# Patient Record
Sex: MALE | Race: BLACK OR AFRICAN AMERICAN | NOT HISPANIC OR LATINO | Employment: OTHER | URBAN - METROPOLITAN AREA
[De-identification: names, ages, dates, MRNs, and addresses within clinical notes are randomized per-mention and may not be internally consistent; named-entity substitution may affect disease eponyms.]

---

## 2020-01-01 ENCOUNTER — HOSPITAL ENCOUNTER (INPATIENT)
Facility: HOSPITAL | Age: 70
LOS: 1 days | DRG: 871 | End: 2020-10-19
Attending: STUDENT IN AN ORGANIZED HEALTH CARE EDUCATION/TRAINING PROGRAM | Admitting: EMERGENCY MEDICINE
Payer: MEDICARE

## 2020-01-01 VITALS
RESPIRATION RATE: 10 BRPM | BODY MASS INDEX: 29.28 KG/M2 | OXYGEN SATURATION: 86 % | DIASTOLIC BLOOD PRESSURE: 75 MMHG | SYSTOLIC BLOOD PRESSURE: 98 MMHG | TEMPERATURE: 96 F | HEIGHT: 78 IN | WEIGHT: 253.06 LBS

## 2020-01-01 DIAGNOSIS — Z51.5 PALLIATIVE CARE ENCOUNTER: ICD-10-CM

## 2020-01-01 DIAGNOSIS — R57.9 SHOCK: ICD-10-CM

## 2020-01-01 DIAGNOSIS — K92.2 GASTROINTESTINAL HEMORRHAGE, UNSPECIFIED GASTROINTESTINAL HEMORRHAGE TYPE: ICD-10-CM

## 2020-01-01 DIAGNOSIS — I95.9 HYPOTENSIVE EPISODE: ICD-10-CM

## 2020-01-01 DIAGNOSIS — I48.91 A-FIB: ICD-10-CM

## 2020-01-01 DIAGNOSIS — I26.99 PULMONARY EMBOLISM: ICD-10-CM

## 2020-01-01 DIAGNOSIS — R41.82 ALTERED MENTAL STATUS: Primary | ICD-10-CM

## 2020-01-01 DIAGNOSIS — D64.9 ANEMIA, UNSPECIFIED TYPE: ICD-10-CM

## 2020-01-01 DIAGNOSIS — I26.99 PULMONARY EMBOLISM, UNSPECIFIED CHRONICITY, UNSPECIFIED PULMONARY EMBOLISM TYPE, UNSPECIFIED WHETHER ACUTE COR PULMONALE PRESENT: ICD-10-CM

## 2020-01-01 DIAGNOSIS — E86.0 DEHYDRATION: ICD-10-CM

## 2020-01-01 DIAGNOSIS — N17.9 AKI (ACUTE KIDNEY INJURY): ICD-10-CM

## 2020-01-01 DIAGNOSIS — R19.7 DIARRHEA, UNSPECIFIED TYPE: ICD-10-CM

## 2020-01-01 DIAGNOSIS — I48.91 ATRIAL FIBRILLATION, UNSPECIFIED TYPE: ICD-10-CM

## 2020-01-01 DIAGNOSIS — I21.4 NSTEMI (NON-ST ELEVATED MYOCARDIAL INFARCTION): ICD-10-CM

## 2020-01-01 DIAGNOSIS — A41.9 SEPSIS, DUE TO UNSPECIFIED ORGANISM, UNSPECIFIED WHETHER ACUTE ORGAN DYSFUNCTION PRESENT: ICD-10-CM

## 2020-01-01 DIAGNOSIS — R79.89 ELEVATED TROPONIN: ICD-10-CM

## 2020-01-01 LAB
ABO + RH BLD: NORMAL
ALBUMIN SERPL BCP-MCNC: 1.6 G/DL (ref 3.5–5.2)
ALP SERPL-CCNC: 174 U/L (ref 55–135)
ALT SERPL W/O P-5'-P-CCNC: 14 U/L (ref 10–44)
ANION GAP SERPL CALC-SCNC: 14 MMOL/L (ref 8–16)
APTT BLDCRRT: 26 SEC (ref 21–32)
ASCENDING AORTA: 3.1 CM
AST SERPL-CCNC: 32 U/L (ref 10–40)
AV INDEX (PROSTH): 1.17
AV MEAN GRADIENT: 2 MMHG
AV PEAK GRADIENT: 3 MMHG
AV VALVE AREA: 3.49 CM2
AV VELOCITY RATIO: 0.98
BACTERIA #/AREA URNS HPF: ABNORMAL /HPF
BASOPHILS # BLD AUTO: 0.02 K/UL (ref 0–0.2)
BASOPHILS NFR BLD: 0.1 % (ref 0–1.9)
BILIRUB SERPL-MCNC: 0.8 MG/DL (ref 0.1–1)
BILIRUB UR QL STRIP: ABNORMAL
BLD GP AB SCN CELLS X3 SERPL QL: NORMAL
BNP SERPL-MCNC: 58 PG/ML (ref 0–99)
BSA FOR ECHO PROCEDURE: 2.51 M2
BUN SERPL-MCNC: 71 MG/DL (ref 8–23)
C DIFF GDH STL QL: POSITIVE
C DIFF TOX A+B STL QL IA: NEGATIVE
CALCIUM SERPL-MCNC: 7.2 MG/DL (ref 8.7–10.5)
CHLORIDE SERPL-SCNC: 108 MMOL/L (ref 95–110)
CLARITY UR: ABNORMAL
CO2 SERPL-SCNC: 11 MMOL/L (ref 23–29)
COLOR UR: ABNORMAL
CREAT SERPL-MCNC: 1.8 MG/DL (ref 0.5–1.4)
CTP QC/QA: YES
CTP QC/QA: YES
CV ECHO LV RWT: 0.25 CM
D DIMER PPP IA.FEU-MCNC: 0.73 MG/L FEU
DIFFERENTIAL METHOD: ABNORMAL
DOP CALC AO PEAK VEL: 0.82 M/S
DOP CALC AO VTI: 8.09 CM
DOP CALC LVOT AREA: 3 CM2
DOP CALC LVOT DIAMETER: 1.95 CM
DOP CALC LVOT PEAK VEL: 0.8 M/S
DOP CALC LVOT STROKE VOLUME: 28.27 CM3
DOP CALCLVOT PEAK VEL VTI: 9.47 CM
ECHO LV POSTERIOR WALL: 0.86 CM (ref 0.6–1.1)
EOSINOPHIL # BLD AUTO: 0 K/UL (ref 0–0.5)
EOSINOPHIL NFR BLD: 0 % (ref 0–8)
ERYTHROCYTE [DISTWIDTH] IN BLOOD BY AUTOMATED COUNT: 17.2 % (ref 11.5–14.5)
EST. GFR  (AFRICAN AMERICAN): 43 ML/MIN/1.73 M^2
EST. GFR  (NON AFRICAN AMERICAN): 37 ML/MIN/1.73 M^2
FECAL OCCULT BLOOD, POC: POSITIVE
FRACTIONAL SHORTENING: 19 % (ref 28–44)
GLUCOSE SERPL-MCNC: 117 MG/DL (ref 70–110)
GLUCOSE UR QL STRIP: ABNORMAL
HCT VFR BLD AUTO: 23 % (ref 40–54)
HGB BLD-MCNC: 7.7 G/DL (ref 14–18)
HGB UR QL STRIP: ABNORMAL
HYALINE CASTS #/AREA URNS LPF: 0 /LPF
IMM GRANULOCYTES # BLD AUTO: 0.51 K/UL (ref 0–0.04)
IMM GRANULOCYTES NFR BLD AUTO: 3.3 % (ref 0–0.5)
INR PPP: 1.3 (ref 0.8–1.2)
INTERVENTRICULAR SEPTUM: 1.1 CM (ref 0.6–1.1)
IVRT: 89.97 MSEC
KETONES UR QL STRIP: NEGATIVE
LA MAJOR: 4.88 CM
LACTATE SERPL-SCNC: 2.2 MMOL/L (ref 0.5–2.2)
LACTATE SERPL-SCNC: 2.9 MMOL/L (ref 0.5–2.2)
LEFT ATRIUM SIZE: 3.98 CM
LEFT INTERNAL DIMENSION IN SYSTOLE: 5.5 CM (ref 2.1–4)
LEFT VENTRICLE DIASTOLIC VOLUME INDEX: 95.36 ML/M2
LEFT VENTRICLE DIASTOLIC VOLUME: 237.94 ML
LEFT VENTRICLE MASS INDEX: 119 G/M2
LEFT VENTRICLE SYSTOLIC VOLUME INDEX: 59 ML/M2
LEFT VENTRICLE SYSTOLIC VOLUME: 147.27 ML
LEFT VENTRICULAR INTERNAL DIMENSION IN DIASTOLE: 6.78 CM (ref 3.5–6)
LEFT VENTRICULAR MASS: 296.76 G
LEUKOCYTE ESTERASE UR QL STRIP: ABNORMAL
LIPASE SERPL-CCNC: 98 U/L (ref 4–60)
LYMPHOCYTES # BLD AUTO: 0.8 K/UL (ref 1–4.8)
LYMPHOCYTES NFR BLD: 5.3 % (ref 18–48)
MCH RBC QN AUTO: 31.3 PG (ref 27–31)
MCHC RBC AUTO-ENTMCNC: 33.5 G/DL (ref 32–36)
MCV RBC AUTO: 94 FL (ref 82–98)
MICROSCOPIC COMMENT: ABNORMAL
MONOCYTES # BLD AUTO: 0.6 K/UL (ref 0.3–1)
MONOCYTES NFR BLD: 4 % (ref 4–15)
NEUTROPHILS # BLD AUTO: 13.6 K/UL (ref 1.8–7.7)
NEUTROPHILS NFR BLD: 87.3 % (ref 38–73)
NITRITE UR QL STRIP: NEGATIVE
NRBC BLD-RTO: 1 /100 WBC
PH UR STRIP: 5 [PH] (ref 5–8)
PISA TR MAX VEL: 2.93 M/S
PLATELET # BLD AUTO: 218 K/UL (ref 150–350)
PMV BLD AUTO: 9.3 FL (ref 9.2–12.9)
POTASSIUM SERPL-SCNC: 4.2 MMOL/L (ref 3.5–5.1)
PROT SERPL-MCNC: 4.3 G/DL (ref 6–8.4)
PROT UR QL STRIP: ABNORMAL
PROTHROMBIN TIME: 14 SEC (ref 9–12.5)
PV PEAK VELOCITY: 1.31 CM/S
RA MAJOR: 3.84 CM
RA PRESSURE: 8 MMHG
RA WIDTH: 3.13 CM
RBC # BLD AUTO: 2.46 M/UL (ref 4.6–6.2)
RBC #/AREA URNS HPF: 13 /HPF (ref 0–4)
RIGHT VENTRICULAR END-DIASTOLIC DIMENSION: 3.11 CM
RV TISSUE DOPPLER FREE WALL SYSTOLIC VELOCITY 1 (APICAL 4 CHAMBER VIEW): 8.45 CM/S
SARS-COV-2 RDRP RESP QL NAA+PROBE: NEGATIVE
SINUS: 2.97 CM
SODIUM SERPL-SCNC: 133 MMOL/L (ref 136–145)
SP GR UR STRIP: 1.02 (ref 1–1.03)
STJ: 2.87 CM
TR MAX PG: 34 MMHG
TRICUSPID ANNULAR PLANE SYSTOLIC EXCURSION: 1.41 CM
TROPONIN I SERPL DL<=0.01 NG/ML-MCNC: 1.88 NG/ML (ref 0–0.03)
TROPONIN I SERPL DL<=0.01 NG/ML-MCNC: 34.9 NG/ML (ref 0–0.03)
TV REST PULMONARY ARTERY PRESSURE: 42 MMHG
URN SPEC COLLECT METH UR: ABNORMAL
UROBILINOGEN UR STRIP-ACNC: NEGATIVE EU/DL
WBC # BLD AUTO: 15.53 K/UL (ref 3.9–12.7)
WBC #/AREA URNS HPF: 8 /HPF (ref 0–5)

## 2020-01-01 PROCEDURE — A4216 STERILE WATER/SALINE, 10 ML: HCPCS | Performed by: STUDENT IN AN ORGANIZED HEALTH CARE EDUCATION/TRAINING PROGRAM

## 2020-01-01 PROCEDURE — 25000003 PHARM REV CODE 250: Performed by: EMERGENCY MEDICINE

## 2020-01-01 PROCEDURE — 87077 CULTURE AEROBIC IDENTIFY: CPT

## 2020-01-01 PROCEDURE — 87046 STOOL CULTR AEROBIC BACT EA: CPT

## 2020-01-01 PROCEDURE — 86920 COMPATIBILITY TEST SPIN: CPT

## 2020-01-01 PROCEDURE — 86901 BLOOD TYPING SEROLOGIC RH(D): CPT

## 2020-01-01 PROCEDURE — 96375 TX/PRO/DX INJ NEW DRUG ADDON: CPT

## 2020-01-01 PROCEDURE — 83880 ASSAY OF NATRIURETIC PEPTIDE: CPT

## 2020-01-01 PROCEDURE — 96365 THER/PROPH/DIAG IV INF INIT: CPT

## 2020-01-01 PROCEDURE — 99291 PR CRITICAL CARE, E/M 30-74 MINUTES: ICD-10-PCS | Mod: ,,, | Performed by: INTERNAL MEDICINE

## 2020-01-01 PROCEDURE — 84484 ASSAY OF TROPONIN QUANT: CPT

## 2020-01-01 PROCEDURE — C1751 CATH, INF, PER/CENT/MIDLINE: HCPCS

## 2020-01-01 PROCEDURE — 25000003 PHARM REV CODE 250: Performed by: STUDENT IN AN ORGANIZED HEALTH CARE EDUCATION/TRAINING PROGRAM

## 2020-01-01 PROCEDURE — C9113 INJ PANTOPRAZOLE SODIUM, VIA: HCPCS | Performed by: EMERGENCY MEDICINE

## 2020-01-01 PROCEDURE — 87449 NOS EACH ORGANISM AG IA: CPT

## 2020-01-01 PROCEDURE — 99223 1ST HOSP IP/OBS HIGH 75: CPT | Mod: ,,, | Performed by: NURSE PRACTITIONER

## 2020-01-01 PROCEDURE — 83605 ASSAY OF LACTIC ACID: CPT | Mod: 91

## 2020-01-01 PROCEDURE — 99498 PR ADVNCD CARE PLAN ADDL 30 MIN: ICD-10-PCS | Mod: ,,, | Performed by: NURSE PRACTITIONER

## 2020-01-01 PROCEDURE — 93005 ELECTROCARDIOGRAM TRACING: CPT

## 2020-01-01 PROCEDURE — 85730 THROMBOPLASTIN TIME PARTIAL: CPT

## 2020-01-01 PROCEDURE — 63600175 PHARM REV CODE 636 W HCPCS: Performed by: EMERGENCY MEDICINE

## 2020-01-01 PROCEDURE — 87040 BLOOD CULTURE FOR BACTERIA: CPT

## 2020-01-01 PROCEDURE — 63600175 PHARM REV CODE 636 W HCPCS: Performed by: INTERNAL MEDICINE

## 2020-01-01 PROCEDURE — 20000000 HC ICU ROOM

## 2020-01-01 PROCEDURE — 84484 ASSAY OF TROPONIN QUANT: CPT | Mod: 91

## 2020-01-01 PROCEDURE — 85610 PROTHROMBIN TIME: CPT

## 2020-01-01 PROCEDURE — 83690 ASSAY OF LIPASE: CPT

## 2020-01-01 PROCEDURE — 63600175 PHARM REV CODE 636 W HCPCS: Performed by: HOSPITALIST

## 2020-01-01 PROCEDURE — 36415 COLL VENOUS BLD VENIPUNCTURE: CPT

## 2020-01-01 PROCEDURE — 87324 CLOSTRIDIUM AG IA: CPT

## 2020-01-01 PROCEDURE — U0002 COVID-19 LAB TEST NON-CDC: HCPCS | Performed by: STUDENT IN AN ORGANIZED HEALTH CARE EDUCATION/TRAINING PROGRAM

## 2020-01-01 PROCEDURE — 87045 FECES CULTURE AEROBIC BACT: CPT

## 2020-01-01 PROCEDURE — 81000 URINALYSIS NONAUTO W/SCOPE: CPT

## 2020-01-01 PROCEDURE — 93010 ELECTROCARDIOGRAM REPORT: CPT | Mod: 76,,, | Performed by: INTERNAL MEDICINE

## 2020-01-01 PROCEDURE — 96360 HYDRATION IV INFUSION INIT: CPT | Mod: 59

## 2020-01-01 PROCEDURE — 87186 SC STD MICRODIL/AGAR DIL: CPT

## 2020-01-01 PROCEDURE — 36430 TRANSFUSION BLD/BLD COMPNT: CPT

## 2020-01-01 PROCEDURE — 36569 INSJ PICC 5 YR+ W/O IMAGING: CPT

## 2020-01-01 PROCEDURE — 93010 ELECTROCARDIOGRAM REPORT: CPT | Mod: ,,, | Performed by: INTERNAL MEDICINE

## 2020-01-01 PROCEDURE — 96361 HYDRATE IV INFUSION ADD-ON: CPT | Mod: 59

## 2020-01-01 PROCEDURE — P9021 RED BLOOD CELLS UNIT: HCPCS

## 2020-01-01 PROCEDURE — 99223 PR INITIAL HOSPITAL CARE,LEVL III: ICD-10-PCS | Mod: ,,, | Performed by: NURSE PRACTITIONER

## 2020-01-01 PROCEDURE — 25000003 PHARM REV CODE 250: Performed by: HOSPITALIST

## 2020-01-01 PROCEDURE — 25500020 PHARM REV CODE 255: Performed by: STUDENT IN AN ORGANIZED HEALTH CARE EDUCATION/TRAINING PROGRAM

## 2020-01-01 PROCEDURE — 85025 COMPLETE CBC W/AUTO DIFF WBC: CPT

## 2020-01-01 PROCEDURE — 80053 COMPREHEN METABOLIC PANEL: CPT

## 2020-01-01 PROCEDURE — 93010 EKG 12-LEAD: ICD-10-PCS | Mod: 76,,, | Performed by: INTERNAL MEDICINE

## 2020-01-01 PROCEDURE — 99291 CRITICAL CARE FIRST HOUR: CPT | Mod: ,,, | Performed by: INTERNAL MEDICINE

## 2020-01-01 PROCEDURE — 85379 FIBRIN DEGRADATION QUANT: CPT

## 2020-01-01 PROCEDURE — 63600175 PHARM REV CODE 636 W HCPCS: Performed by: STUDENT IN AN ORGANIZED HEALTH CARE EDUCATION/TRAINING PROGRAM

## 2020-01-01 PROCEDURE — 99497 ADVNCD CARE PLAN 30 MIN: CPT | Mod: 25,,, | Performed by: NURSE PRACTITIONER

## 2020-01-01 PROCEDURE — 63600175 PHARM REV CODE 636 W HCPCS: Performed by: NURSE PRACTITIONER

## 2020-01-01 PROCEDURE — 87427 SHIGA-LIKE TOXIN AG IA: CPT

## 2020-01-01 PROCEDURE — 99498 ADVNCD CARE PLAN ADDL 30 MIN: CPT | Mod: ,,, | Performed by: NURSE PRACTITIONER

## 2020-01-01 PROCEDURE — 99497 PR ADVNCD CARE PLAN 30 MIN: ICD-10-PCS | Mod: 25,,, | Performed by: NURSE PRACTITIONER

## 2020-01-01 PROCEDURE — 99291 CRITICAL CARE FIRST HOUR: CPT | Mod: 25

## 2020-01-01 PROCEDURE — 87493 C DIFF AMPLIFIED PROBE: CPT

## 2020-01-01 RX ORDER — ONDANSETRON 2 MG/ML
8 INJECTION INTRAMUSCULAR; INTRAVENOUS EVERY 6 HOURS PRN
Status: DISCONTINUED | OUTPATIENT
Start: 2020-01-01 | End: 2020-01-01 | Stop reason: HOSPADM

## 2020-01-01 RX ORDER — POTASSIUM CHLORIDE 750 MG/1
10 CAPSULE, EXTENDED RELEASE ORAL ONCE
COMMUNITY

## 2020-01-01 RX ORDER — POLYETHYLENE GLYCOL 3350 17 G/17G
17 POWDER, FOR SOLUTION ORAL 2 TIMES DAILY PRN
Status: DISCONTINUED | OUTPATIENT
Start: 2020-01-01 | End: 2020-01-01 | Stop reason: HOSPADM

## 2020-01-01 RX ORDER — SODIUM CHLORIDE 0.9 % (FLUSH) 0.9 %
10 SYRINGE (ML) INJECTION
Status: DISCONTINUED | OUTPATIENT
Start: 2020-01-01 | End: 2020-01-01 | Stop reason: HOSPADM

## 2020-01-01 RX ORDER — HEPARIN SODIUM,PORCINE/D5W 25000/250
18 INTRAVENOUS SOLUTION INTRAVENOUS CONTINUOUS
Status: DISCONTINUED | OUTPATIENT
Start: 2020-01-01 | End: 2020-01-01 | Stop reason: HOSPADM

## 2020-01-01 RX ORDER — PANTOPRAZOLE SODIUM 40 MG/10ML
40 INJECTION, POWDER, LYOPHILIZED, FOR SOLUTION INTRAVENOUS 2 TIMES DAILY
Status: DISCONTINUED | OUTPATIENT
Start: 2020-01-01 | End: 2020-01-01 | Stop reason: HOSPADM

## 2020-01-01 RX ORDER — MIDODRINE HYDROCHLORIDE 2.5 MG/1
2.5 TABLET ORAL 2 TIMES DAILY WITH MEALS
Status: DISCONTINUED | OUTPATIENT
Start: 2020-01-01 | End: 2020-01-01 | Stop reason: HOSPADM

## 2020-01-01 RX ORDER — SODIUM CHLORIDE 0.9 % (FLUSH) 0.9 %
10 SYRINGE (ML) INJECTION EVERY 6 HOURS
Status: DISCONTINUED | OUTPATIENT
Start: 2020-01-01 | End: 2020-01-01 | Stop reason: HOSPADM

## 2020-01-01 RX ORDER — DIGOXIN 0.25 MG/ML
500 INJECTION INTRAMUSCULAR; INTRAVENOUS
Status: COMPLETED | OUTPATIENT
Start: 2020-01-01 | End: 2020-01-01

## 2020-01-01 RX ORDER — MORPHINE SULFATE 4 MG/ML
2 INJECTION, SOLUTION INTRAMUSCULAR; INTRAVENOUS
Status: DISCONTINUED | OUTPATIENT
Start: 2020-01-01 | End: 2020-01-01 | Stop reason: HOSPADM

## 2020-01-01 RX ORDER — ONDANSETRON 4 MG/1
4 TABLET, FILM COATED ORAL EVERY 8 HOURS PRN
COMMUNITY

## 2020-01-01 RX ORDER — OMEPRAZOLE 20 MG/1
20 CAPSULE, DELAYED RELEASE ORAL DAILY
COMMUNITY

## 2020-01-01 RX ORDER — LOPERAMIDE HYDROCHLORIDE 2 MG/1
2 CAPSULE ORAL 4 TIMES DAILY PRN
COMMUNITY

## 2020-01-01 RX ORDER — MIDODRINE HYDROCHLORIDE 5 MG/1
2.5 TABLET ORAL 2 TIMES DAILY WITH MEALS
COMMUNITY

## 2020-01-01 RX ORDER — ACETAMINOPHEN 325 MG/1
650 TABLET ORAL EVERY 4 HOURS PRN
Status: DISCONTINUED | OUTPATIENT
Start: 2020-01-01 | End: 2020-01-01 | Stop reason: HOSPADM

## 2020-01-01 RX ORDER — SIMVASTATIN 40 MG/1
40 TABLET, FILM COATED ORAL NIGHTLY
COMMUNITY

## 2020-01-01 RX ORDER — LORAZEPAM 2 MG/ML
1 INJECTION INTRAMUSCULAR
Status: DISCONTINUED | OUTPATIENT
Start: 2020-01-01 | End: 2020-01-01 | Stop reason: HOSPADM

## 2020-01-01 RX ORDER — HYDROCODONE BITARTRATE AND ACETAMINOPHEN 500; 5 MG/1; MG/1
TABLET ORAL
Status: DISCONTINUED | OUTPATIENT
Start: 2020-01-01 | End: 2020-01-01 | Stop reason: HOSPADM

## 2020-01-01 RX ORDER — PANTOPRAZOLE SODIUM 40 MG/10ML
40 INJECTION, POWDER, LYOPHILIZED, FOR SOLUTION INTRAVENOUS
Status: COMPLETED | OUTPATIENT
Start: 2020-01-01 | End: 2020-01-01

## 2020-01-01 RX ORDER — MORPHINE SULFATE 4 MG/ML
1 INJECTION, SOLUTION INTRAMUSCULAR; INTRAVENOUS ONCE
Status: COMPLETED | OUTPATIENT
Start: 2020-01-01 | End: 2020-01-01

## 2020-01-01 RX ORDER — PANTOPRAZOLE SODIUM 40 MG/1
40 TABLET, DELAYED RELEASE ORAL DAILY
COMMUNITY

## 2020-01-01 RX ORDER — TAMSULOSIN HYDROCHLORIDE 0.4 MG/1
0.4 CAPSULE ORAL DAILY
COMMUNITY

## 2020-01-01 RX ORDER — MIDODRINE HYDROCHLORIDE 2.5 MG/1
2.5 TABLET ORAL 2 TIMES DAILY WITH MEALS
Status: DISCONTINUED | OUTPATIENT
Start: 2020-01-01 | End: 2020-01-01

## 2020-01-01 RX ORDER — LORAZEPAM 2 MG/ML
1 INJECTION INTRAMUSCULAR ONCE
Status: COMPLETED | OUTPATIENT
Start: 2020-01-01 | End: 2020-01-01

## 2020-01-01 RX ORDER — NOREPINEPHRINE BITARTRATE/D5W 4MG/250ML
0.05 PLASTIC BAG, INJECTION (ML) INTRAVENOUS CONTINUOUS
Status: DISCONTINUED | OUTPATIENT
Start: 2020-01-01 | End: 2020-01-01 | Stop reason: HOSPADM

## 2020-01-01 RX ORDER — MUPIROCIN 20 MG/G
OINTMENT TOPICAL 2 TIMES DAILY
Status: DISCONTINUED | OUTPATIENT
Start: 2020-01-01 | End: 2020-01-01 | Stop reason: HOSPADM

## 2020-01-01 RX ADMIN — DIGOXIN 500 MCG: 0.25 INJECTION INTRAMUSCULAR; INTRAVENOUS at 07:10

## 2020-01-01 RX ADMIN — VANCOMYCIN HYDROCHLORIDE 2000 MG: 10 INJECTION, POWDER, LYOPHILIZED, FOR SOLUTION INTRAVENOUS at 06:10

## 2020-01-01 RX ADMIN — HEPARIN SODIUM AND DEXTROSE 18 UNITS/KG/HR: 10000; 5 INJECTION INTRAVENOUS at 07:10

## 2020-01-01 RX ADMIN — MORPHINE SULFATE 2 MG: 4 INJECTION INTRAVENOUS at 01:10

## 2020-01-01 RX ADMIN — AMIODARONE HYDROCHLORIDE 1 MG/MIN: 1.8 INJECTION, SOLUTION INTRAVENOUS at 07:10

## 2020-01-01 RX ADMIN — Medication 0.8 MCG/KG/MIN: at 10:10

## 2020-01-01 RX ADMIN — Medication 0.05 MCG/KG/MIN: at 04:10

## 2020-01-01 RX ADMIN — Medication 10 ML: at 06:10

## 2020-01-01 RX ADMIN — PANTOPRAZOLE SODIUM 40 MG: 40 INJECTION, POWDER, FOR SOLUTION INTRAVENOUS at 05:10

## 2020-01-01 RX ADMIN — Medication 0.85 MCG/KG/MIN: at 09:10

## 2020-01-01 RX ADMIN — Medication 0.8 MCG/KG/MIN: at 11:10

## 2020-01-01 RX ADMIN — Medication 0.85 MCG/KG/MIN: at 08:10

## 2020-01-01 RX ADMIN — AMIODARONE HYDROCHLORIDE 1 MG/MIN: 1.8 INJECTION, SOLUTION INTRAVENOUS at 12:10

## 2020-01-01 RX ADMIN — AMIODARONE HYDROCHLORIDE 150 MG: 1.5 INJECTION, SOLUTION INTRAVENOUS at 07:10

## 2020-01-01 RX ADMIN — SODIUM CHLORIDE, SODIUM LACTATE, POTASSIUM CHLORIDE, AND CALCIUM CHLORIDE 2000 ML: .6; .31; .03; .02 INJECTION, SOLUTION INTRAVENOUS at 01:10

## 2020-01-01 RX ADMIN — MIDODRINE HYDROCHLORIDE 2.5 MG: 2.5 TABLET ORAL at 11:10

## 2020-01-01 RX ADMIN — IOHEXOL 100 ML: 350 INJECTION, SOLUTION INTRAVENOUS at 04:10

## 2020-01-01 RX ADMIN — CEFTRIAXONE 2 G: 2 INJECTION, SOLUTION INTRAVENOUS at 05:10

## 2020-01-01 RX ADMIN — MORPHINE SULFATE 1 MG: 4 INJECTION INTRAVENOUS at 01:10

## 2020-01-01 RX ADMIN — NOREPINEPHRINE BITARTRATE 3 MCG/KG/MIN: 1 INJECTION, SOLUTION, CONCENTRATE INTRAVENOUS at 02:10

## 2020-01-01 RX ADMIN — LORAZEPAM 1 MG: 2 INJECTION INTRAMUSCULAR; INTRAVENOUS at 12:10

## 2020-01-01 RX ADMIN — MUPIROCIN: 20 OINTMENT TOPICAL at 08:10

## 2020-01-01 RX ADMIN — NOREPINEPHRINE BITARTRATE 0.8 MCG/KG/MIN: 1 INJECTION, SOLUTION, CONCENTRATE INTRAVENOUS at 12:10

## 2020-01-01 RX ADMIN — Medication 0.8 MCG/KG/MIN: at 12:10

## 2020-01-01 RX ADMIN — PIPERACILLIN AND TAZOBACTAM 4.5 G: 4; .5 INJECTION, POWDER, LYOPHILIZED, FOR SOLUTION INTRAVENOUS; PARENTERAL at 08:10

## 2020-01-01 RX ADMIN — Medication 125 MG: at 11:10

## 2020-01-01 RX ADMIN — Medication 0.92 MCG/KG/MIN: at 08:10

## 2020-01-01 RX ADMIN — Medication 10 ML: at 12:10

## 2020-01-01 RX ADMIN — SODIUM CHLORIDE, SODIUM LACTATE, POTASSIUM CHLORIDE, AND CALCIUM CHLORIDE 1000 ML: .6; .31; .03; .02 INJECTION, SOLUTION INTRAVENOUS at 04:10

## 2020-10-19 PROBLEM — E87.20 METABOLIC ACIDOSIS: Status: ACTIVE | Noted: 2020-01-01

## 2020-10-19 PROBLEM — D72.829 LEUKOCYTOSIS: Status: ACTIVE | Noted: 2020-01-01

## 2020-10-19 PROBLEM — I26.99 ACUTE PULMONARY EMBOLISM: Status: ACTIVE | Noted: 2020-01-01

## 2020-10-19 PROBLEM — I21.4 NSTEMI (NON-ST ELEVATED MYOCARDIAL INFARCTION): Status: ACTIVE | Noted: 2020-01-01

## 2020-10-19 PROBLEM — I48.91 ATRIAL FIBRILLATION: Status: ACTIVE | Noted: 2020-01-01

## 2020-10-19 PROBLEM — N17.9 ARF (ACUTE RENAL FAILURE): Status: ACTIVE | Noted: 2020-01-01

## 2020-10-19 PROBLEM — G93.41 ACUTE METABOLIC ENCEPHALOPATHY: Status: ACTIVE | Noted: 2020-01-01

## 2020-10-19 PROBLEM — D64.9 ANEMIA: Status: ACTIVE | Noted: 2020-01-01

## 2020-10-19 PROBLEM — R57.9 SHOCK: Status: ACTIVE | Noted: 2020-01-01

## 2020-10-19 PROBLEM — E87.1 HYPONATREMIA: Status: ACTIVE | Noted: 2020-01-01

## 2020-10-19 PROBLEM — R41.82 ALTERED MENTAL STATUS: Status: ACTIVE | Noted: 2020-01-01

## 2020-10-19 NOTE — SUBJECTIVE & OBJECTIVE
Palliative medicine consult received and discussed with Dr. Dyer.  Patient resting with son, Ben, at bedside.  Patient oriented to person only.  Able to answer simple questions only.  Palliative services introduced to Ben and he was receptive to visit.      Prior to hospitalization, patient was oriented x 3 and interactive. He has been bedbound for approx 1 year, after experiencing severe knee pain and undergoing knee replacement, with no improvement. Due to coronavirus restrictions at nursing home, visits were limited but pt's son and daughter visited as often as they could.  Per Ben, patient has lost more than 100 pounds in last year (per medical records, on 7/18/19, patient's weight was 406, now 253).  Ben discussed the decline in function that has been difficult for patient, since August 2020. He has required on hospitalization in the last year and has ongoing hypotension, all of which has been concerning for patient and his son.     Advance Care Planning     Power of   I initiated the process of advance care planning today and explained the importance of this process to the patient's son.  I introduced the concept of advance directives, as well. The patient has previously appointed a HCPOA and designated [facility] as [facility].   [facility] which was verified and a copy placed in chart.     Patient also has a daughter, Delmi, who is very involved in patient's care.      Discussed code status options, including risks and benefits of full code status, partial code status, and DNR status.  Ben stated that patient would want every measure taken to extend life, including CPR and mechanical ventilation, even if quality of life was decreased.  During this conversation, although patient had initially appeared comfortable and denied complaints, he began to appear agitated and asked for BP cuff to be removed.  At first, he was able to be consoled and within a few minutes,  agitation increased. Discussed interventions for agitation, including ativan and initially Ben declined as he was concerned regarding possibility of hypotension.  However, as patient's agitation continued, discussed risks vs benefits and Ben ok with Ativan.  Discussed that small dose could be given and increased if needed or discontinued if needed. Ben became upset with the change in cognition and offered to continue goals of care conversation in conference room to provide a space for him to express emotions. Time was allowed for him to express concerns.  Emotional support provided.  Ben expressed concern that his presence was worsening patient's condition and it was discussed that medical conditions can affect behavior and it appears that patient's medical condition is declining rapidly.     Advance Care Planning     [facility]  I engaged the family in a conversation about advance care planning and we specifically addressed what the goals of care would be moving forward, in light of the patient's change in clinical status, specifically shock, pulmonary embolism, STEMI.  We did specifically address the patient's likely prognosis, which is poor. During this conversation, Bne became appropriately tearful and discussed his mother (patient's wife) death, while in the ICU, after rapid decline.      He understands that patient's status has rapidly declined and although he previously stated he wanted every opportunity to extend patient's life, we discussed risks and benefits of code status further in relation to decline since conversation a few minutes ago. We explored the patient's values and preferences for future care.  The family endorses that what is most important right now is to focus on symptom/pain control and quality of life, even if it means sacrificing a little time.  Ben states that it is most important that patient be comfortable and if he had to choose between more time and less quality of  life vs more quality of life/comfort and less time, he would choose to focus on comfort only. At this time, he became more tearful and provide him a few minutes alone to process emotions.  Discussed conversation with Dr. Dyer and Dr. Pandya, who was evaluating patient.  Since patient's son had stepped away to discuss goals of care, patient had decompensated further, now with increased agitation and now with chest pain.     Returned to patient's son with Dr. Pandya, who updated Valance on patient's condition.  Valance confirmed decision to transition to comfort care and DNR status. Offered to contact family and he declined, stating that he had just spoke to patient's daughter, Delmi.  Ben will go to  his son from school so that he can visit.  Discussed with Dr. Pandya, who ordered comfort interventions.     I did not explain the role for hospice care at this stage of the patient's illness due to acute change in condition. We will not be making a hospice referral at this time but will determine based on condition.     Encounter: [id number]      1400: Patient's friend, [place], at bedside to visit.  Patient's son had returned with his son but left to bring his son home and plans to return.     1515: Notified of patient's death.  Call to patient's son, no answer.  Offered condolences to [place] and he stated he talked to patient's children and they have been notified.  Berlin requested to wait for family to arrive in waiting room.  He was escorted to waiting room and  notified.  Bereavement tray ordered.                         Past Medical History:   Diagnosis Date    BPH (benign prostatic hyperplasia)     CKD (chronic kidney disease)     Constipation     GERD (gastroesophageal reflux disease)     Hemiplegia affecting dominant side, post-stroke     Hyperlipidemia     Hypertension     Hypokalemia     Muscle wasting and atrophy, not elsewhere classified, multiple sites     Orthostatic  hypotension     Osteoarthritis     Stroke     TIA (transient ischemic attack)     UTI (urinary tract infection)        Past Surgical History:   Procedure Laterality Date    CHOLECYSTECTOMY      JOINT REPLACEMENT      TRP         Review of patient's allergies indicates:  No Known Allergies    Medications:  Continuous Infusions:   amiodarone in dextrose 5% Stopped (10/19/20 1516)    heparin (porcine) in D5W Stopped (10/19/20 1516)    norepinephrine bitartrate-D5W Stopped (10/19/20 1516)    norepinephrine bitartrate-D5W Stopped (10/19/20 1240)     Scheduled Meds:   midodrine  2.5 mg Oral BID WM    mupirocin   Nasal BID    pantoprazole  40 mg Intravenous BID    piperacillin-tazobactam (ZOSYN) IVPB  4.5 g Intravenous Q8H    sodium chloride 0.9%  10 mL Intravenous Q6H    vancomycin  125 mg Oral Q6H     PRN Meds:sodium chloride, acetaminophen, heparin (PORCINE), heparin (PORCINE), lorazepam, morphine, ondansetron, polyethylene glycol, Flushing PICC Protocol **AND** sodium chloride 0.9% **AND** sodium chloride 0.9%, sodium chloride 0.9%    Family History     None        Tobacco Use    Smoking status: Unknown If Ever Smoked   Substance and Sexual Activity    Alcohol use: Not Currently    Drug use: Not on file    Sexual activity: Not on file       Review of Systems   Unable to perform ROS: Mental status change     Objective:     Vital Signs (Most Recent):  Temp: 96.4 °F (35.8 °C) (10/19/20 1346)  Pulse: (!) 0 (10/19/20 1515)  Resp: 10 (10/19/20 1515)  BP: 98/75 (10/19/20 1245)  SpO2: (!) 86 % (10/19/20 1515) Vital Signs (24h Range):  Temp:  [94.6 °F (34.8 °C)-98 °F (36.7 °C)] 96.4 °F (35.8 °C)  Pulse:  [0-150] 0  Resp:  [10-40] 10  SpO2:  [83 %-100 %] 86 %  BP: ()/(34-88) 98/75     Weight: 114.8 kg (253 lb 1.4 oz)  Body mass index is 29.25 kg/m².    Physical Exam  Vitals signs and nursing note reviewed.   Constitutional:       General: He is not in acute distress.     Appearance: He is  ill-appearing.   HENT:      Head: Normocephalic and atraumatic.      Nose: Nose normal.   Eyes:      General:         Right eye: No discharge.         Left eye: No discharge.   Neck:      Musculoskeletal: Normal range of motion.   Cardiovascular:      Rate and Rhythm: Tachycardia present.   Pulmonary:      Effort: Pulmonary effort is normal. No respiratory distress.   Abdominal:      Palpations: Abdomen is soft.   Skin:     General: Skin is warm and dry.   Neurological:      Motor: Weakness present.      Comments: + Confusion, able to answer simple questions         Review of Symptoms    Symptom Assessment (ESAS 0-10 Scale)  Pain:  0  Dyspnea:  0  Anxiety:  0  Nausea:  0  Depression:  0  Anorexia:  0  Fatigue:  0  Insomnia:  0  Restlessness:  0  Agitation:  0         Comments:  Unable to complete ESAS: altered mental status          Performance Status:  10    Living Arrangements:  Lives in nursing home      Advance Care Planning   Advance Directives:   Living Will: No        Oral Declaration: No    LaPOST: No    Do Not Resuscitate Status: Yes    Medical Power of : Yes    Agent's Name:  Ben Olivia   Agent's Contact Number:  [phone]    Decision Making:  Family answered questions and Patient unable to communicate due to disease severity/cognitive impairment         Significant Labs: All pertinent labs within the past 24 hours have been reviewed.  CBC:   Recent Labs   Lab 10/19/20  0229   WBC 15.53*   HGB 7.7*   HCT 23.0*   MCV 94        BMP:  Recent Labs   Lab 10/19/20  0149   *   *   K 4.2      CO2 11*   BUN 71*   CREATININE 1.8*   CALCIUM 7.2*     LFT:  Lab Results   Component Value Date    AST 32 10/19/2020    ALKPHOS 174 (H) 10/19/2020    BILITOT 0.8 10/19/2020     Albumin:   Albumin   Date Value Ref Range Status   10/19/2020 1.6 (L) 3.5 - 5.2 g/dL Final     Protein:   Total Protein   Date Value Ref Range Status   10/19/2020 4.3 (L) 6.0 - 8.4 g/dL Final     Lactic acid:    Lab Results   Component Value Date    LACTATE 2.9 (H) 10/19/2020    LACTATE 2.2 10/19/2020       Significant Imaging: I have reviewed all pertinent imaging results/findings within the past 24 hours.

## 2020-10-19 NOTE — HPI
69 y/o male was sent from NH with altered mental status.  Patient is currently confused and unable to give history.  History obtained from ER notes and daughter.  Patient is apparently very sharp with normal mental status per daughter.  He presented to ER where he was noted to be hypothermic, tachycardic and hypotensive.  Patient recently underwent left knee replacement at [place] on 8/2020.  Per daughter, he was also recently treated for UTI with IV ABx's per PICC line.  He has been complaining of severe diarrhea since ABx's stopped.  Patient noted to be in rapid Afib.  No previous hx of Afib.  Labs showing leukocytosis and H/H lower than recent labs.  Stool heme positive in ER.  Labs also showing ARF and elevated Troponin.  CTA of chest showing filling defect and CT of abdomen showing colonic wall inflammation.  No further history able to be obtained.

## 2020-10-19 NOTE — SUBJECTIVE & OBJECTIVE
Past Medical History:   Diagnosis Date    BPH (benign prostatic hyperplasia)     CKD (chronic kidney disease)     Constipation     GERD (gastroesophageal reflux disease)     Hemiplegia affecting dominant side, post-stroke     Hyperlipidemia     Hypertension     Hypokalemia     Muscle wasting and atrophy, not elsewhere classified, multiple sites     Orthostatic hypotension     Osteoarthritis     Stroke     TIA (transient ischemic attack)     UTI (urinary tract infection)        Past Surgical History:   Procedure Laterality Date    CHOLECYSTECTOMY      JOINT REPLACEMENT      TRP         Review of patient's allergies indicates:  No Known Allergies    No current facility-administered medications on file prior to encounter.      Current Outpatient Medications on File Prior to Encounter   Medication Sig    midodrine (PROAMATINE) 5 MG Tab Take 2.5 mg by mouth 2 (two) times daily with meals.    omeprazole (PRILOSEC) 20 MG capsule Take 20 mg by mouth once daily.    pantoprazole (PROTONIX) 40 MG tablet Take 40 mg by mouth once daily.    potassium chloride (MICRO-K) 10 MEQ CpSR Take 10 mEq by mouth once.    simvastatin (ZOCOR) 40 MG tablet Take 40 mg by mouth every evening.    tamsulosin (FLOMAX) 0.4 mg Cap Take 0.4 mg by mouth once daily.    loperamide (IMODIUM) 2 mg capsule Take 2 mg by mouth 4 (four) times daily as needed for Diarrhea.    ondansetron (ZOFRAN) 4 MG tablet Take 4 mg by mouth every 8 (eight) hours as needed for Nausea.     Family History     None        Tobacco Use    Smoking status: Unknown If Ever Smoked   Substance and Sexual Activity    Alcohol use: Not Currently    Drug use: Not on file    Sexual activity: Not on file     Review of Systems   Unable to perform ROS: Mental status change     Objective:     Vital Signs (Most Recent):  Temp: (!) 95 °F (35 °C) (10/19/20 0705)  Pulse: (!) 117 (10/19/20 0845)  Resp: (!) 24 (10/19/20 0845)  BP: (!) 93/55 (10/19/20 0845)  SpO2: 99 %  (10/19/20 0850) Vital Signs (24h Range):  Temp:  [94.6 °F (34.8 °C)-96.5 °F (35.8 °C)] 95 °F (35 °C)  Pulse:  [] 117  Resp:  [12-25] 24  SpO2:  [85 %-100 %] 99 %  BP: ()/(34-88) 93/55     Weight: 114.8 kg (253 lb 1.4 oz)  Body mass index is 29.25 kg/m².    Physical Exam  Constitutional:       General: He is awake.      Appearance: He is ill-appearing.   HENT:      Head: Normocephalic and atraumatic.      Mouth/Throat:      Mouth: Mucous membranes are dry.      Pharynx: No oropharyngeal exudate or posterior oropharyngeal erythema.   Eyes:      General:         Right eye: No discharge.         Left eye: No discharge.      Conjunctiva/sclera: Conjunctivae normal.   Neck:      Musculoskeletal: Neck supple. No neck rigidity.   Cardiovascular:      Rate and Rhythm: Tachycardia present. Rhythm irregular.   Pulmonary:      Breath sounds: Normal breath sounds. No wheezing.   Abdominal:      General: Bowel sounds are normal.      Palpations: Abdomen is soft.   Musculoskeletal:         General: No swelling or tenderness.   Skin:     General: Skin is dry.      Comments: Cool   Neurological:      Comments: Awake  Able to answer simple questions, but remains confused.   Psychiatric:         Speech: Speech is delayed.         Cognition and Memory: Cognition is impaired.             Significant Labs:   BMP:   Recent Labs   Lab 10/19/20  0149   *   *   K 4.2      CO2 11*   BUN 71*   CREATININE 1.8*   CALCIUM 7.2*     CBC:   Recent Labs   Lab 10/19/20  0229   WBC 15.53*   HGB 7.7*   HCT 23.0*          Significant Imaging: I have reviewed all pertinent imaging results/findings within the past 24 hours.

## 2020-10-19 NOTE — ED TRIAGE NOTES
Pt arrives via EMS after noticed AMS at [place] on Saturday, pt skin is cool, in Afib  currently, denies hx of A-fib. palpable BP 60 per EMS, O2 sensor not picking up due to pt cool digits. Unable to obtain IV. Pt normal alerted to self, place, and time. Pt denies any pain at this time.

## 2020-10-19 NOTE — SUBJECTIVE & OBJECTIVE
Past Medical History:   Diagnosis Date    BPH (benign prostatic hyperplasia)     CKD (chronic kidney disease)     Constipation     GERD (gastroesophageal reflux disease)     Hemiplegia affecting dominant side, post-stroke     Hyperlipidemia     Hypertension     Hypokalemia     Muscle wasting and atrophy, not elsewhere classified, multiple sites     Orthostatic hypotension     Osteoarthritis     Stroke     TIA (transient ischemic attack)     UTI (urinary tract infection)        Past Surgical History:   Procedure Laterality Date    CHOLECYSTECTOMY      JOINT REPLACEMENT      TRP         Review of patient's allergies indicates:  No Known Allergies    Family History     None        Tobacco Use    Smoking status: Unknown If Ever Smoked   Substance and Sexual Activity    Alcohol use: Not Currently    Drug use: Not on file    Sexual activity: Not on file         Review of Systems   Unable to perform ROS: Mental status change     Objective:     Vital Signs (Most Recent):  Temp: 96.8 °F (36 °C) (10/19/20 1123)  Pulse: 106 (10/19/20 1200)  Resp: (!) 32 (10/19/20 1321)  BP: (!) 92/52 (10/19/20 1145)  SpO2: 98 % (10/19/20 1200) Vital Signs (24h Range):  Temp:  [94.6 °F (34.8 °C)-98 °F (36.7 °C)] 96.8 °F (36 °C)  Pulse:  [] 106  Resp:  [12-40] 32  SpO2:  [83 %-100 %] 98 %  BP: ()/(34-88) 92/52     Weight: 114.8 kg (253 lb 1.4 oz)  Body mass index is 29.25 kg/m².      Intake/Output Summary (Last 24 hours) at 10/19/2020 1338  Last data filed at 10/19/2020 1200  Gross per 24 hour   Intake 3223.36 ml   Output 90 ml   Net 3133.36 ml       Physical Exam  Vitals signs and nursing note reviewed.   Constitutional:       General: He is in acute distress.      Appearance: He is well-developed. He is ill-appearing. He is not toxic-appearing or diaphoretic.   HENT:      Head: Normocephalic and atraumatic.   Eyes:      General: No scleral icterus.     Extraocular Movements: Extraocular movements intact.    Neck:      Musculoskeletal: Normal range of motion.      Vascular: No JVD.      Trachea: No tracheal deviation.   Cardiovascular:      Rate and Rhythm: Tachycardia present. Rhythm irregular.      Heart sounds: No murmur. No friction rub. No gallop.    Pulmonary:      Effort: Pulmonary effort is normal.      Breath sounds: No stridor. Rales present. No wheezing.   Abdominal:      General: Bowel sounds are normal. There is no distension.      Palpations: Abdomen is soft.   Musculoskeletal:         General: No swelling.   Skin:     General: Skin is dry.      Capillary Refill: Capillary refill takes more than 3 seconds.      Findings: No erythema or rash.   Neurological:      Comments: Moves all extremities   Psychiatric:      Comments: Agitated, not able to verbalize         Vents:       Lines/Drains/Airways     Peripherally Inserted Central Catheter Line            PICC Triple Lumen 10/19/20 0110 right brachial less than 1 day          Drain                 Urethral Catheter 10/19/20 0050 Coude 16 Fr. less than 1 day          Peripheral Intravenous Line                 Peripheral IV - Single Lumen 10/19/20 0030 22 G Left Hand less than 1 day                Significant Labs:    CBC/Anemia Profile:  Recent Labs   Lab 10/19/20  0229   WBC 15.53*   HGB 7.7*   HCT 23.0*      MCV 94   RDW 17.2*        Chemistries:  Recent Labs   Lab 10/19/20  0149   *   K 4.2      CO2 11*   BUN 71*   CREATININE 1.8*   CALCIUM 7.2*   ALBUMIN 1.6*   PROT 4.3*   BILITOT 0.8   ALKPHOS 174*   ALT 14   AST 32       All pertinent labs within the past 24 hours have been reviewed.    Significant Imaging:   I have reviewed and interpreted all pertinent imaging results/findings within the past 24 hours.

## 2020-10-19 NOTE — ASSESSMENT & PLAN NOTE
Possibly combination of hypovolemic shock with probable septic shock.  BP did not improve in ER with IVF bolus and started on Levophed.  Did note that patient on home Midodrine (chronically hypotensive?).  Empirically started on Vanc and Zosyn.  Possible Cdiff colitis as source of infection.  Start oral Vanc pending cultures.  Recent UTI treated with IV ABx's.  UA showing 2+ leukocytes, but only 8 WBC.  Await BCx's.  Wean Levophed as possible.  Resume home Midodrine.

## 2020-10-19 NOTE — DISCHARGE SUMMARY
[facility] Medicine  Discharge Summary      Patient Name: Madhavi Olivia  MRN: [id number]  Admission Date: 10/19/2020  Hospital Length of Stay: 0 days  Discharge Date and Time:  10/19/2020 3:29 PM  Attending Physician: John Paul Dyer MD   Discharging Provider: John Paul Dyer MD  Primary Care Provider: No primary care provider on file.      HPI:   71 y/o male was sent from NH with altered mental status.  Patient is currently confused and unable to give history.  History obtained from ER notes and daughter.  Patient is apparently very sharp with normal mental status per daughter.  He presented to ER where he was noted to be hypothermic, tachycardic and hypotensive.  Patient recently underwent left knee replacement at [place] on 8/2020.  Per daughter, he was also recently treated for UTI with IV ABx's per PICC line.  He has been complaining of severe diarrhea since ABx's stopped.  Patient noted to be in rapid Afib.  No previous hx of Afib.  Labs showing leukocytosis and H/H lower than recent labs.  Stool heme positive in ER.  Labs also showing ARF and elevated Troponin.  CTA of chest showing filling defect and CT of abdomen showing colonic wall inflammation.  No further history able to be obtained.    * No surgery found *      Hospital Course:   71 y/o male was sent from NH with altered mental status. He presented to ER where he was noted to be hypothermic, tachycardic and hypotensive.  Patient recently underwent left knee replacement at [place] on 8/2020.  Per daughter, he was also recently treated for UTI with IV ABx's per PICC line.  He has been complaining of severe diarrhea since ABx's stopped.  Patient noted to be in rapid Afib.  No previous hx of Afib.  Labs showing leukocytosis and H/H lower than recent labs.  Stool heme positive in ER.  Labs also showing ARF and elevated Troponin.  CTA of chest showing filling defect and CT of abdomen showing colonic wall  inflammation.  Patient started on Heparin drip and transfused 1 unit of blood.  GI, Cardiology and Critical Care consulted.  Patient admitted with shock.  Possibly combination of hypovolemic shock with probable septic shock.  BP did not improve in ER with IVF bolus and started on Levophed.  Did note that patient on home Midodrine (chronically hypotensive?).  Empirically started on Vanc and Zosyn.  Possible Cdiff colitis as source of infection.  Oral Vanc ordered.  Recent UTI treated with IV ABx's.  UA showing 2+ leukocytes, but only 8 WBC.  BCx obtained.  Patient started on Amiodarone gtt.  Repeat Troponin of 34, consistent with NSTEMI.  Echo showing EF of 10%, so also probable cardiogenic shock. Patient's condition continued to deteriorate with more evidence of arrhythmia and worsening hypotension.  Worsening mental status.  Conversation was held with family about patient's critical condition.  Patient's son (POA) stated patient would not want invasive measures to prolong life.  Patient was made DNR.  He was continued on all drips and pressors.  Condition continued to worsen until he was found in asystole.  Patient declared dead.  Family notified.     Consults:   Consults (From admission, onward)        Status Ordering Provider     Inpatient consult to Cardiology  Once     Provider:  Amadou Polo MD    Acknowledged JHOANA ABRAHAM     Inpatient consult to Gastroenterology  Once     Provider:  [facility]    Completed JHOANA ABRAHAM     Inpatient consult to Palliative Care  Once     Provider:  Elsa Yee NP    Acknowledged JHOANA ABRAHAM     Inpatient consult to PICC team (\[facility] [place] [facility]\"")  Once     Provider:  (Not yet assigned)    Completed PERICO SHANE     Inpatient consult to Pulmonology  Once     Provider:  Moustapha Pandya MD    Completed JHOANA ABRAHAM          * Shock  Possibly combination of hypovolemic shock with probable septic  shock.  BP did not improve in ER with IVF bolus and started on Levophed.  Did note that patient on home Midodrine (chronically hypotensive?).  Empirically started on Vanc and Zosyn.  Possible Cdiff colitis as source of infection.  Start oral Vanc pending cultures.  Recent UTI treated with IV ABx's.  UA showing 2+ leukocytes, but only 8 WBC.  Await BCx's.  Wean Levophed as possible.  Resume home Midodrine.          Final Active Diagnoses:    Diagnosis Date Noted POA    PRINCIPAL PROBLEM:  Shock [R57.9] 10/19/2020 Yes    Altered mental status [R41.82] 10/19/2020 Yes    Atrial fibrillation [I48.91] 10/19/2020 Yes    Leukocytosis [D72.829] 10/19/2020 Yes    Anemia [D64.9] 10/19/2020 Yes    Hyponatremia [E87.1] 10/19/2020 Yes    Metabolic acidosis [E87.2] 10/19/2020 Yes    ARF (acute renal failure) [N17.9] 10/19/2020 Yes    NSTEMI (non-ST elevated myocardial infarction) [I21.4] 10/19/2020 Yes    Acute pulmonary embolism [I26.99] 10/19/2020 Yes    Acute metabolic encephalopathy [G93.41] 10/19/2020 Yes      Problems Resolved During this Admission:       Discharged Condition:     Disposition:     Follow Up:    Patient Instructions:   No discharge procedures on file.      Pending Diagnostic Studies:     Procedure Component Value Units Date/Time    EKG 12-lead [[id number]] Collected: 10/19/20 1249    Order Status: Sent Lab Status: In process Updated: 10/19/20 1339    Narrative:      Test Reason : I21.4,    Vent. Rate : 102 BPM     Atrial Rate : 113 BPM     P-R Int : 000 ms          QRS Dur : 102 ms      QT Int : 342 ms       P-R-T Axes : 000 -58 099 degrees     QTc Int : 445 ms    Program found technically poor ECG  Undetermined rhythm  Left axis deviation  Low voltage QRS  Inferior infarct (cited on or before 19-OCT-2020)  Anterolateral infarct (cited on or before 19-OCT-2020)  Abnormal ECG  When compared with ECG of 19-OCT-2020 01:10,  Significant changes have occurred    Referred By: [email or web address]   SELF            Confirmed By:     Troponin-I [[id number]]     Order Status: Sent Lab Status: No result     Specimen: Blood          Medications:  None (patient  at medical facility)    Indwelling Lines/Drains at time of discharge:   Lines/Drains/Airways     Peripherally Inserted Central Catheter Line            PICC Triple Lumen 10/19/20 011 right brachial less than 1 day          Drain                 Urethral Catheter 10/19/20 0050 Coude 16 Fr. less than 1 day                Time spent on the discharge of patient: >30 minutes  Patient was seen and examined on the date of discharge and determined to be suitable for discharge.    John Paul Dyer MD  Department of Hospital Medicine  [facility]

## 2020-10-19 NOTE — CONSULTS
[facility]  Pulmonology  Consult Note    Patient Name: Madhavi Olivia  MRN: [id number]  Admission Date: 10/19/2020  Hospital Length of Stay: 0 days  Code Status: DNR  Attending Physician: John Paul Dyer MD  Primary Care Provider: No primary care provider on file.   Principal Problem: Shock    Inpatient consult to Pulmonology  Consult performed by: Moustapha Pandya MD  Consult ordered by: John Paul Dyer MD        Subjective:     HPI:  71 yo w/ h/o TIA/CVA, CKD and knee replacement who presented on 10/19 w/ acute encephalopathy and back pain. History obtained from chart review as patient is unable to answer questions reliably at the time of my exam.    Patient with recent UTI on IV abx via PICC c/b c. Diff colitis. He presented to the ED for worsening confusion.    In the ED, he was found to be hypothermic, hypotensive and encephalopathic. WBC 15K. Trop elevated and EKG concerning. CTA showed a possible LLL thrombus so he was started on a hep gtt. He was also in afib w/ RVR to 150s so amio was started. He was admitted to the ICU for further monitoring. Also on Vanc and Zosyn empirically.    Patient does nod his yes when asked about chest pain.    Past Medical History:   Diagnosis Date    BPH (benign prostatic hyperplasia)     CKD (chronic kidney disease)     Constipation     GERD (gastroesophageal reflux disease)     Hemiplegia affecting dominant side, post-stroke     Hyperlipidemia     Hypertension     Hypokalemia     Muscle wasting and atrophy, not elsewhere classified, multiple sites     Orthostatic hypotension     Osteoarthritis     Stroke     TIA (transient ischemic attack)     UTI (urinary tract infection)        Past Surgical History:   Procedure Laterality Date    CHOLECYSTECTOMY      JOINT REPLACEMENT      TRP         Review of patient's allergies indicates:  No Known Allergies    Family History     None        Tobacco Use    Smoking status: Unknown If Ever  Smoked   Substance and Sexual Activity    Alcohol use: Not Currently    Drug use: Not on file    Sexual activity: Not on file         Review of Systems   Unable to perform ROS: Mental status change     Objective:     Vital Signs (Most Recent):  Temp: 96.8 °F (36 °C) (10/19/20 1123)  Pulse: 106 (10/19/20 1200)  Resp: (!) 32 (10/19/20 1321)  BP: (!) 92/52 (10/19/20 1145)  SpO2: 98 % (10/19/20 1200) Vital Signs (24h Range):  Temp:  [94.6 °F (34.8 °C)-98 °F (36.7 °C)] 96.8 °F (36 °C)  Pulse:  [] 106  Resp:  [12-40] 32  SpO2:  [83 %-100 %] 98 %  BP: ()/(34-88) 92/52     Weight: 114.8 kg (253 lb 1.4 oz)  Body mass index is 29.25 kg/m².      Intake/Output Summary (Last 24 hours) at 10/19/2020 1338  Last data filed at 10/19/2020 1200  Gross per 24 hour   Intake 3223.36 ml   Output 90 ml   Net 3133.36 ml       Physical Exam  Vitals signs and nursing note reviewed.   Constitutional:       General: He is in acute distress.      Appearance: He is well-developed. He is ill-appearing. He is not toxic-appearing or diaphoretic.   HENT:      Head: Normocephalic and atraumatic.   Eyes:      General: No scleral icterus.     Extraocular Movements: Extraocular movements intact.   Neck:      Musculoskeletal: Normal range of motion.      Vascular: No JVD.      Trachea: No tracheal deviation.   Cardiovascular:      Rate and Rhythm: Tachycardia present. Rhythm irregular.      Heart sounds: No murmur. No friction rub. No gallop.    Pulmonary:      Effort: Pulmonary effort is normal.      Breath sounds: No stridor. Rales present. No wheezing.   Abdominal:      General: Bowel sounds are normal. There is no distension.      Palpations: Abdomen is soft.   Musculoskeletal:         General: No swelling.   Skin:     General: Skin is dry.      Capillary Refill: Capillary refill takes more than 3 seconds.      Findings: No erythema or rash.   Neurological:      Comments: Moves all extremities   Psychiatric:      Comments: Agitated, not  able to verbalize         Vents:       Lines/Drains/Airways     Peripherally Inserted Central Catheter Line            PICC Triple Lumen 10/19/20 0110 right brachial less than 1 day          Drain                 Urethral Catheter 10/19/20 0050 Coude 16 Fr. less than 1 day          Peripheral Intravenous Line                 Peripheral IV - Single Lumen 10/19/20 0030 22 G Left Hand less than 1 day                Significant Labs:    CBC/Anemia Profile:  Recent Labs   Lab 10/19/20  0229   WBC 15.53*   HGB 7.7*   HCT 23.0*      MCV 94   RDW 17.2*        Chemistries:  Recent Labs   Lab 10/19/20  0149   *   K 4.2      CO2 11*   BUN 71*   CREATININE 1.8*   CALCIUM 7.2*   ALBUMIN 1.6*   PROT 4.3*   BILITOT 0.8   ALKPHOS 174*   ALT 14   AST 32       All pertinent labs within the past 24 hours have been reviewed.    Significant Imaging:   I have reviewed and interpreted all pertinent imaging results/findings within the past 24 hours.    Assessment/Plan:     * Shock  Suspect his is 2/2 sepsis and possible cardiogenic shock related to afib w/ RVR and depressed LVEF. POCUS with estimated LVEF of 10%. There is a possible small PE on CTA in the LLL, but it does not appear acute. No e/o RV strain.    - Cont heparin gtt  - Cont BSAbx  - PICC line placed on admission  - Hold on steroids at this time  - F/u cultures  - Hold on cardioversion to avoid discomfort  - Wean levophed as tolerated    Acute metabolic encephalopathy  Suspect this is 2/2 sepsis as well as a possible MI. Not able to articulate but is able to say yes and no. Mental status actively worsening. GOC discussion with son who is the patient's POA. He states the patient would not want invasive measures to prolong life and would like to focus more on comfort.     Acute pulmonary embolism  Imaging not c/w an acute PE. If there is a clot there, it is either subacute or chronic as it is adhered to the vessel wall. Agree w/ anticoagulation.    NSTEMI  (non-ST elevated myocardial infarction)  1st EKG with ST elevations in the same area as significant q-waves. On a hep gtt. Cardiology consulted. Does have chest pain. Repeat EKG with normalization of the ST elevations. Trop now 34. Suspect demand ischemia 2/2 anemia, septic shock, afib w/ rvr.    ARF (acute renal failure)  2/2 sepsis and cardiogenic shock. UOP minimal. Renally dose medications.    Metabolic acidosis  Anion gap and non-anion gap metabolic acidosis 2/2 LA and renal failure/diarrhea respectively.    Anemia  Heme-positive brown stool. CTM. Getting 1 unit pRBCs    Atrial fibrillation  With RVR on amiodarone. Technically meets unstable criteria. Plan for a more comfort care approach.    Critical Care Time: 40 minutes  Critical care was time spent personally by me on the following activities: evaluating this patient's organ dysfunction, development of treatment plan, discussing treatment plan with patient or surrogate and bedside caregivers, discussions with consultants, evaluation of patient's response to treatment, examination of patient, ordering and performing treatments and interventions, ordering and review of laboratory studies, ordering and review of radiographic studies, re-evaluation of patient's condition. This critical care time did not overlap with that of any other provider or involve time for any procedures.            Thank you for your consult. I will follow-up with patient. Please contact us if you have any additional questions.     Moustapha Pandya MD  Pulmonology  [facility]

## 2020-10-19 NOTE — ASSESSMENT & PLAN NOTE
Patient's H/H lower than baseline.  No obvious bleeding, but stool is heme positive.  Will transfuse 2 units of blood.  Patient on Heparin gtt for PE.  Start IV Protonix and GI consulted.  Monitor closely.

## 2020-10-19 NOTE — ASSESSMENT & PLAN NOTE
"CTA showing "Partially occlusive filling defect within a left lower lobe segmental branch concerning for pulmonary thromboembolism".  Starting Heparin gtt.  Pulmonary consulted.  Check Echo    "

## 2020-10-19 NOTE — PROCEDURES
"Madhavi Olivia is a 70 y.o. male patient.    Temp: 96.5 °F (35.8 °C) (10/19/20 0100)  Pulse: 96 (10/19/20 0122)  Resp: (!) 21 (10/19/20 0114)  BP: 124/88 (10/19/20 0122)  Weight: 99.8 kg (220 lb) (10/19/20 0018)  Height: 6' 5" (195.6 cm) (10/19/20 0018)    PICC  Date/Time: 10/19/2020 1:25 AM  Performed by: Fidencio Mclaughlin RN  Consent Done: Yes  Time out: Immediately prior to procedure a time out was called to verify the correct patient, procedure, equipment, support staff and site/side marked as required  Indications: med administration and vascular access  Anesthesia: local infiltration  Local anesthetic: lidocaine 1% without epinephrine  Anesthetic Total (mL): 5  Preparation: skin prepped with ChloraPrep  Skin prep agent dried: skin prep agent completely dried prior to procedure  Sterile barriers: all five maximum sterile barriers used - cap, mask, sterile gown, sterile gloves, and large sterile sheet  Hand hygiene: hand hygiene performed prior to central venous catheter insertion  Location details: right brachial  Catheter type: triple lumen  Catheter size: 5 Fr  Catheter Length: 40cm    Ultrasound guidance: yes  Vessel Caliber: medium, compressibility normal  Needle advanced into vessel with real time Ultrasound guidance.  Guidewire confirmed in vessel.  Sterile sheath used.  Number of attempts: 1  Post-procedure: blood return through all ports, chlorhexidine patch and sterile dressing applied            Fidencio Mclaughlin  10/19/2020  "

## 2020-10-19 NOTE — H&P
[facility] Hospital Medicine  History & Physical    Patient Name: Madhavi Olivia  MRN: [id number]  Admission Date: 10/19/2020  Attending Physician: John Paul Dyer MD   Primary Care Provider: No primary care provider on file.         Patient information was obtained from relative(s) and ER records.     Subjective:     Principal Problem:Shock    Chief Complaint:   Chief Complaint   Patient presents with    Altered Mental Status     Pt arrives via EMS after noticed AMS at [place] on Saturday, pt skin is cool, in Afib  currently, palpable BP 60 per EMS, O2 sensor not picking up due to pt cool digits.  Unable to obtain IV.  Pt normal alerted to self, place, and time        HPI: 71 y/o male was sent from NH with altered mental status.  Patient is currently confused and unable to give history.  History obtained from ER notes and daughter.  Patient is apparently very sharp with normal mental status per daughter.  He presented to ER where he was noted to be hypothermic, tachycardic and hypotensive.  Patient recently underwent left knee replacement at [place] on 8/2020.  Per daughter, he was also recently treated for UTI with IV ABx's per PICC line.  He has been complaining of severe diarrhea since ABx's stopped.  Patient noted to be in rapid Afib.  No previous hx of Afib.  Labs showing leukocytosis and H/H lower than recent labs.  Stool heme positive in ER.  Labs also showing ARF and elevated Troponin.  CTA of chest showing filling defect and CT of abdomen showing colonic wall inflammation.  No further history able to be obtained.    Past Medical History:   Diagnosis Date    BPH (benign prostatic hyperplasia)     CKD (chronic kidney disease)     Constipation     GERD (gastroesophageal reflux disease)     Hemiplegia affecting dominant side, post-stroke     Hyperlipidemia     Hypertension     Hypokalemia     Muscle wasting and atrophy, not elsewhere classified, multiple sites      Orthostatic hypotension     Osteoarthritis     Stroke     TIA (transient ischemic attack)     UTI (urinary tract infection)        Past Surgical History:   Procedure Laterality Date    CHOLECYSTECTOMY      JOINT REPLACEMENT      TRP         Review of patient's allergies indicates:  No Known Allergies    No current facility-administered medications on file prior to encounter.      Current Outpatient Medications on File Prior to Encounter   Medication Sig    midodrine (PROAMATINE) 5 MG Tab Take 2.5 mg by mouth 2 (two) times daily with meals.    omeprazole (PRILOSEC) 20 MG capsule Take 20 mg by mouth once daily.    pantoprazole (PROTONIX) 40 MG tablet Take 40 mg by mouth once daily.    potassium chloride (MICRO-K) 10 MEQ CpSR Take 10 mEq by mouth once.    simvastatin (ZOCOR) 40 MG tablet Take 40 mg by mouth every evening.    tamsulosin (FLOMAX) 0.4 mg Cap Take 0.4 mg by mouth once daily.    loperamide (IMODIUM) 2 mg capsule Take 2 mg by mouth 4 (four) times daily as needed for Diarrhea.    ondansetron (ZOFRAN) 4 MG tablet Take 4 mg by mouth every 8 (eight) hours as needed for Nausea.     Family History     None        Tobacco Use    Smoking status: Unknown If Ever Smoked   Substance and Sexual Activity    Alcohol use: Not Currently    Drug use: Not on file    Sexual activity: Not on file     Review of Systems   Unable to perform ROS: Mental status change     Objective:     Vital Signs (Most Recent):  Temp: (!) 95 °F (35 °C) (10/19/20 0705)  Pulse: (!) 117 (10/19/20 0845)  Resp: (!) 24 (10/19/20 0845)  BP: (!) 93/55 (10/19/20 0845)  SpO2: 99 % (10/19/20 0845) Vital Signs (24h Range):  Temp:  [94.6 °F (34.8 °C)-96.5 °F (35.8 °C)] 95 °F (35 °C)  Pulse:  [] 117  Resp:  [12-25] 24  SpO2:  [85 %-100 %] 99 %  BP: ()/(34-88) 93/55     Weight: 114.8 kg (253 lb 1.4 oz)  Body mass index is 29.25 kg/m².    Physical Exam  Constitutional:       General: He is awake.      Appearance: He is  "ill-appearing.   HENT:      Head: Normocephalic and atraumatic.      Mouth/Throat:      Mouth: Mucous membranes are dry.      Pharynx: No oropharyngeal exudate or posterior oropharyngeal erythema.   Eyes:      General:         Right eye: No discharge.         Left eye: No discharge.      Conjunctiva/sclera: Conjunctivae normal.   Neck:      Musculoskeletal: Neck supple. No neck rigidity.   Cardiovascular:      Rate and Rhythm: Tachycardia present. Rhythm irregular.   Pulmonary:      Breath sounds: Normal breath sounds. No wheezing.   Abdominal:      General: Bowel sounds are normal.      Palpations: Abdomen is soft.   Musculoskeletal:         General: No swelling or tenderness.   Skin:     General: Skin is dry.      Comments: Cool   Neurological:      Comments: Awake  Able to answer simple questions, but remains confused.   Psychiatric:         Speech: Speech is delayed.         Cognition and Memory: Cognition is impaired.             Significant Labs:   BMP:   Recent Labs   Lab 10/19/20  0149   *   *   K 4.2      CO2 11*   BUN 71*   CREATININE 1.8*   CALCIUM 7.2*     CBC:   Recent Labs   Lab 10/19/20  0229   WBC 15.53*   HGB 7.7*   HCT 23.0*          Significant Imaging: I have reviewed all pertinent imaging results/findings within the past 24 hours.    Assessment/Plan:     * Shock  Possibly combination of hypovolemic shock with probable septic shock.  BP did not improve in ER with IVF bolus and started on Levophed.  Did note that patient on home Midodrine (chronically hypotensive?).  Empirically started on Vanc and Zosyn.  Possible Cdiff colitis as source of infection.  Start oral Vanc pending cultures.  Recent UTI treated with IV ABx's.  UA showing 2+ leukocytes, but only 8 WBC.  Await BCx's.  Wean Levophed as possible.  Resume home Midodrine.        Acute pulmonary embolism  CTA showing "Partially occlusive filling defect within a left lower lobe segmental branch concerning for " "pulmonary thromboembolism".  Starting Heparin gtt.  Pulmonary consulted.  Check Echo      NSTEMI (non-ST elevated myocardial infarction)  Possible demand ischemia from shock and uncontrolled HR.  Already on Heparin gtt for PE.  Cardiology consulted.      ARF (acute renal failure)  Probably pre renal secondary to fluid depletion.  Patient dehydrated secondary to diarrhea.  Given fluids in ER.   Now receiving blood.  Will closely monitor.  Avoid nephrotoxic medications.      Metabolic acidosis  Secondary to renal failure and lactic acidosis.      Hyponatremia  Probably secondary to fluid depletion.      Anemia  Patient's H/H lower than baseline.  No obvious bleeding, but stool is heme positive.  Will transfuse 2 units of blood.  Patient on Heparin gtt for PE.  Start IV Protonix and GI consulted.  Monitor closely.      Leukocytosis  Treat as possible septic shock as above.      Atrial fibrillation  No apparent hx of AFib.  Presented with Afib with RVR.  Placed on Amiodarone gtt.  Cardiology consulted.      Altered mental status  Metabolic encephalopathy secondary to shock and ARF.  Continue to monitor.        VTE Risk Mitigation (From admission, onward)         Ordered     IP VTE HIGH RISK PATIENT  Once      10/19/20 0958     Place sequential compression device  Until discontinued      10/19/20 0958     heparin 25,000 units in dextrose 5% 250 mL (100 units/mL) infusion HIGH INTENSITY nomogram - [facility]  Continuous     Question:  Heparin Infusion Adjustment (DO NOT MODIFY ANSWER)  Answer:  \\[email or web address]\Images\Pharmacy\HeparinInfusions\heparin HIGH INTENSITY nomogram for [facility] [id number]    10/19/20 0620     heparin 25,000 units in dextrose 5% (100 units/ml) IV bolus from bag - ADDITIONAL PRN BOLUS - 30 units/kg  As needed (PRN)     Question:  Heparin Infusion Adjustment (DO NOT MODIFY ANSWER)  Answer:  \[email or web address]\Images\Pharmacy\HeparinInfusions\heparin HIGH INTENSITY nomogram for [facility] [id number]    10/19/20 0620 "     heparin 25,000 units in dextrose 5% (100 units/ml) IV bolus from bag - ADDITIONAL PRN BOLUS - 60 units/kg  As needed (PRN)     Question:  Heparin Infusion Adjustment (DO NOT MODIFY ANSWER)  Answer:  [email or web address]\[employer]\Images\Pharmacy\HeparinInfusions\heparin HIGH INTENSITY nomogram for [facility] [id number]    10/19/20 0620              Critical care time spent on the evaluation and treatment of severe organ dysfunction, review of pertinent labs and imaging studies, discussions with consulting providers and discussions with patient/family: 60 minutes.     John Paul Dyer MD  Department of Hospital Medicine   [facility]

## 2020-10-19 NOTE — ASSESSMENT & PLAN NOTE
With RVR on amiodarone. Technically meets unstable criteria. Plan for a more comfort care approach.

## 2020-10-19 NOTE — HPI
71 yo w/ h/o TIA/CVA, CKD and knee replacement who presented on 10/19 w/ acute encephalopathy and back pain. History obtained from chart review as patient is unable to answer questions reliably at the time of my exam.    Patient with recent UTI on IV abx via PICC c/b c. Diff colitis. He presented to the ED for worsening confusion.    In the ED, he was found to be hypothermic, hypotensive and encephalopathic. WBC 15K. Trop elevated and EKG concerning. CTA showed a possible LLL thrombus so he was started on a hep gtt. He was also in afib w/ RVR to 150s so amio was started. He was admitted to the ICU for further monitoring. Also on Vanc and Zosyn empirically.    Patient does nod his yes when asked about chest pain.

## 2020-10-19 NOTE — ASSESSMENT & PLAN NOTE
Possibly combination of hypovolemic shock with probable septic shock.  BP did not improve in ER with IVF bolus and started on Levophed.  Did not that patient on home Midodrine (chronically hypotensive?).  Empirically started on Vanc and Zosyn.  Possible Cdiff colitis as source of infection.  Start oral Vanc pending cultures.  Recent UTI treated with IV ABx's.  UA showing 2+ leukocytes, but only 8 WBC.  Await BCx's.  Wean Levophed as possible.  Resume home Midodrine.

## 2020-10-19 NOTE — CONSULTS
.[facility]  Gastroenterology  Consult Note    Patient Name: Madhavi Olivia  MRN: [id number]  Admission Date: 10/19/2020  Hospital Length of Stay: 0 days  Code Status: No Order   Primary Care Physician: No primary care provider on file.  Principal Problem:<principal problem not specified>    Inpatient consult to Gastroenterology  Consult performed by: Christian Benitez MD  Consult ordered by: John Paul Dyer MD        Subjective:     Chief complaint: altered mental status    HPI: Mr. Madhavi Olivia is a 70 y.o. man with hx of right sided hemiplegia, UTIs who was sent from nursing home with altered mental status.  GI consulted for anemia and heme+ stool in the setting of heparin drip for PE.  He has been having a lot of green watery stool.  He was recently treated with IV antibiotics for UTI.  On admit he was hypothermic, hypotensive and encephalopathic with leukocytosis, possible new PE.  He was started on a heparin drip.  He denies abdominal pain, nausea, vomiting, hematochezia, melena though history unreliable.    History obtained from chart review due to patient's condition.    Past medical history:  Past Medical History:   Diagnosis Date    BPH (benign prostatic hyperplasia)     CKD (chronic kidney disease)     Constipation     GERD (gastroesophageal reflux disease)     Hemiplegia affecting dominant side, post-stroke     Hyperlipidemia     Hypertension     Hypokalemia     Muscle wasting and atrophy, not elsewhere classified, multiple sites     Orthostatic hypotension     Osteoarthritis     Stroke     TIA (transient ischemic attack)     UTI (urinary tract infection)        Past surgical history:  Past Surgical History:   Procedure Laterality Date    CHOLECYSTECTOMY      JOINT REPLACEMENT      TRP         Social history:  Social History     Socioeconomic History    Marital status: Other     Spouse name: Not on file    Number of children: Not on file    Years of  education: Not on file    Highest education level: Not on file   Occupational History    Not on file   Social Needs    Financial resource strain: Not on file    Food insecurity     Worry: Not on file     Inability: Not on file    Transportation needs     Medical: Not on file     Non-medical: Not on file   Tobacco Use    Smoking status: Unknown If Ever Smoked   Substance and Sexual Activity    Alcohol use: Not Currently    Drug use: Not on file    Sexual activity: Not on file   Lifestyle    Physical activity     Days per week: Not on file     Minutes per session: Not on file    Stress: Not on file   Relationships    Social connections     Talks on phone: Not on file     Gets together: Not on file     Attends Christianity service: Not on file     Active member of club or organization: Not on file     Attends meetings of clubs or organizations: Not on file     Relationship status: Not on file   Other Topics Concern    Not on file   Social History Narrative    Not on file       Family history:  History reviewed. No pertinent family history.    Medications:  Medications Prior to Admission   Medication Sig Dispense Refill Last Dose    midodrine (PROAMATINE) 5 MG Tab Take 2.5 mg by mouth 2 (two) times daily with meals.   10/19/2020    omeprazole (PRILOSEC) 20 MG capsule Take 20 mg by mouth once daily.   10/19/2020    pantoprazole (PROTONIX) 40 MG tablet Take 40 mg by mouth once daily.   10/19/2020    potassium chloride (MICRO-K) 10 MEQ CpSR Take 10 mEq by mouth once.   10/19/2020    simvastatin (ZOCOR) 40 MG tablet Take 40 mg by mouth every evening.   10/19/2020    tamsulosin (FLOMAX) 0.4 mg Cap Take 0.4 mg by mouth once daily.   10/19/2020    loperamide (IMODIUM) 2 mg capsule Take 2 mg by mouth 4 (four) times daily as needed for Diarrhea.   Unknown    ondansetron (ZOFRAN) 4 MG tablet Take 4 mg by mouth every 8 (eight) hours as needed for Nausea.   Unknown       Allergies:  Review of patient's allergies  indicates:  No Known Allergies    Review of systems:  Unable to obtain due to AMS.    Objective:     Vital Signs (Most Recent):  Temp: (!) 95 °F (35 °C) (10/19/20 0705)  Pulse: (!) 117 (10/19/20 0845)  Resp: (!) 24 (10/19/20 0845)  BP: (!) 93/55 (10/19/20 0845)  SpO2: 99 % (10/19/20 0845) Vital Signs (24h Range):  Temp:  [94.6 °F (34.8 °C)-96.5 °F (35.8 °C)] 95 °F (35 °C)  Pulse:  [] 117  Resp:  [12-25] 24  SpO2:  [85 %-100 %] 99 %  BP: ()/(34-88) 93/55     Physical examination:  General: well nourished, no apparent distress, ill appearing  HENT: NCAT, hearing grossly intact, no palpable or visible thyroid mass  Eyes: EOMI, anicteric sclera  LYMH: No cervical, supraclavicular or axillary lymphadenopathy   Cardiovascular: tachy, irregular. No murmurs appreciated.  Lungs: Non-labored respirations. Breath sounds equal.   Abdomen: soft, NTND, normoactive BS  Extremities: No C/C/E, 2+ dorsalis pedis pulses bilaterally  Skin: No jaundice, rashes or lesions    Labs:  CBC:   Recent Labs   Lab 10/19/20  0229   WBC 15.53*   HGB 7.7*   HCT 23.0*        CMP:   Recent Labs   Lab 10/19/20  0149   *   CALCIUM 7.2*   ALBUMIN 1.6*   PROT 4.3*   *   K 4.2   CO2 11*      BUN 71*   CREATININE 1.8*   ALKPHOS 174*   ALT 14   AST 32   BILITOT 0.8     Coagulation:   Recent Labs   Lab 10/19/20  0619   INR 1.3*   APTT 26.0       Imaging:  CT: I have reviewed all results within the past 24 hours and my personal findings are:  1.  Relatively diffuse colonic wall thickening extending from the cecum to the rectum.  There is mild associated fat stranding, somewhat more pronounced about the rectum.  This may relate to a nonspecific proctocolitis, including infectious, inflammatory, or ischemic etiologies.  Clinical correlation is advised.  Consider follow-up colonoscopy following resolution of symptoms to exclude underlying neoplastic process.      Assessment:   Mr. Madhavi Olivia is a 70 y.o. man with      1. Shock, likely septic  2. Acute pulmonary embolism  3. Altered mental status  4. Diarrhea: suspect C diff  5. Anemia: Heme+ stool but no overt GI bleeding  6. NSTEMI  7. Acute renal failure  8. Atrial fibrillation  9 Hx of right sided hemiplegia, UTIs, HFrEF (EF 10%)      Plan:     - Follow up stool tests. Awaiting C diff PCR. Start PO vancomycin  - Trend H/H, keep Hgb >8 given NSTEMI  - Continue IV BID PPI  - No urgent need for endoscopy at this time      Thank you for your consult. Please call if there are any additional questions or concerns. I will continue to follow.    Christian Benitez MD  Gastroenterology  [facility]

## 2020-10-19 NOTE — ASSESSMENT & PLAN NOTE
No apparent hx of AFib.  Presented with Afib with RVR.  Placed on Amiodarone gtt.  Cardiology consulted.

## 2020-10-19 NOTE — ASSESSMENT & PLAN NOTE
1st EKG with ST elevations in the same area as significant q-waves. On a hep gtt. Cardiology consulted. Does have chest pain. Repeat EKG with normalization of the ST elevations. Trop now 34. Suspect demand ischemia 2/2 anemia, septic shock, afib w/ rvr.

## 2020-10-19 NOTE — ED PROVIDER NOTES
Encounter Date: 10/19/2020    SCRIBE #1 NOTE: I, Babita Reyes, am scribing for, and in the presence of,  Halina Dorsey DO. I have scribed the following portions of the note - Other sections scribed: HPI, ROS, PE.       History     Chief Complaint   Patient presents with    Altered Mental Status     Pt arrives via EMS after noticed AMS at [place] on Saturday, pt skin is cool, in Afib  currently, palpable BP 60 per EMS, O2 sensor not picking up due to pt cool digits.  Unable to obtain IV.  Pt normal alerted to self, place, and time     This is a 69 y/o male with a past medical history of stroke with right-sided hemiplegia, UTI who is presenting to the ED via EMS from [facility] with a CC of altered mental status that began 1 day ago. Patient was Afib upon EMS examination, with no PMHx of Afib. Heart rate dropped from 220 to 90s several times. Blood pressure initially was 60 palpable. EMS reports strong odor to urine output. Nursing home says he is oriented to self only, and has no PMHx of Dementia or Alzheimer's. Patient has history of UTIs. NKDA. The patient's daughter (Delmi [phone]) reports that the patient was admitted in August to [facility] with a UTI and discharge with IV antibiotics via PICC line.  She also reports that since being at the nursing facility that the patient has not had good p.o. intake.    The history is provided by the nursing home and the EMS personnel. The history is limited by the condition of the patient.     Review of patient's allergies indicates:  No Known Allergies  Past Medical History:   Diagnosis Date    BPH (benign prostatic hyperplasia)     CKD (chronic kidney disease)     Constipation     GERD (gastroesophageal reflux disease)     Hemiplegia affecting dominant side, post-stroke     Hyperlipidemia     Hypertension     Hypokalemia     Muscle wasting and atrophy, not elsewhere classified, multiple sites     Orthostatic  hypotension     Osteoarthritis     Stroke     TIA (transient ischemic attack)     UTI (urinary tract infection)      Past Surgical History:   Procedure Laterality Date    CHOLECYSTECTOMY      JOINT REPLACEMENT      TRP       History reviewed. No pertinent family history.  Social History     Tobacco Use    Smoking status: Unknown If Ever Smoked   Substance Use Topics    Alcohol use: Not Currently    Drug use: Not on file     Review of Systems   Unable to perform ROS: Mental status change (Altered mental status)   Constitutional: Negative for diaphoresis.   Respiratory: Negative for cough and shortness of breath.    Gastrointestinal: Negative for vomiting.   Psychiatric/Behavioral: Positive for confusion.       Physical Exam     Initial Vitals   BP Pulse Resp Temp SpO2   10/19/20 0114 10/19/20 0018 10/19/20 0018 10/19/20 0100 10/19/20 0307   (!) 90/51 109 (!) 22 96.5 °F (35.8 °C) 100 %      MAP       --                Physical Exam    Nursing note and vitals reviewed.  Constitutional: Vital signs are normal. He appears well-developed and well-nourished. He is not diaphoretic. He appears ill. No distress.   Chronically ill-appearing male   HENT:   Head: Normocephalic and atraumatic.   Right Ear: External ear normal.   Left Ear: External ear normal.   Dry mucous membranes   Eyes: Conjunctivae and EOM are normal. Right eye exhibits no discharge. Left eye exhibits no discharge. No scleral icterus.   Neck: Normal range of motion. Neck supple. No tracheal deviation present. No JVD present.   Cardiovascular: Intact distal pulses and normal pulses.   Morning.  Iregularly irregular rhythm   Pulmonary/Chest: Breath sounds normal. No stridor. No respiratory distress.   Abdominal: Soft. There is no abdominal tenderness. There is no rebound and no guarding.   Genitourinary:    Penis normal.   Rectum:      Guaiac result positive.   Guaiac positive stool. : Acceptable.  Musculoskeletal: Normal range of  motion.   Neurological: He is alert. He is disoriented.   Disoriented to time and situation.  Oriented to person and place.  Right hemiparesis present.  Moves left upper extremity left lower extremity without difficulty.  No obvious facial droop.  Patient unable to completely cooperate with exam due to confusion   Skin: Skin is dry. Capillary refill takes 2 to 3 seconds. No rash noted. There is pallor.   Excoriation to rectum without signs of ulcers.  No induration or drainage.    Old scar to left knee well healed  Skin cool to touch    Psychiatric: He has a normal mood and affect. His behavior is normal.         ED Course   Critical Care    Date/Time: 10/19/2020 4:41 AM  Performed by: Halina Dorsey DO  Authorized by: Halina Dorsey DO   Direct patient critical care time: 15 minutes  Additional history critical care time: 10 minutes  Ordering / reviewing critical care time: 15 minutes  Documentation critical care time: 15 minutes  Consulting other physicians critical care time: 5 minutes  Consult with family critical care time: 10 minutes  Total critical care time (exclusive of procedural time) : 70 minutes  Critical care time was exclusive of separately billable procedures and treating other patients and teaching time.  Critical care was necessary to treat or prevent imminent or life-threatening deterioration of the following conditions: cardiac failure, circulatory failure, shock and sepsis.  Critical care was time spent personally by me on the following activities: development of treatment plan with patient or surrogate, discussions with consultants, evaluation of patient's response to treatment, examination of patient, obtaining history from patient or surrogate, ordering and performing treatments and interventions, ordering and review of laboratory studies, ordering and review of radiographic studies, re-evaluation of patient's condition and review of old charts.        Labs Reviewed    CBC W/ AUTO DIFFERENTIAL - Abnormal; Notable for the following components:       Result Value    WBC 15.53 (*)     RBC 2.46 (*)     Hemoglobin 7.7 (*)     Hematocrit 23.0 (*)     Mean Corpuscular Hemoglobin 31.3 (*)     RDW 17.2 (*)     Immature Granulocytes 3.3 (*)     Gran # (ANC) 13.6 (*)     Immature Grans (Abs) 0.51 (*)     Lymph # 0.8 (*)     nRBC 1 (*)     Gran% 87.3 (*)     Lymph% 5.3 (*)     All other components within normal limits    Narrative:     Recoll. [id number] by BL1 at 10/19/2020 02:24, reason: Specimen   clotted  Tube has been discarded  Called to MAURICIO Baldwin  @ 0223 on 19Oct2020.  [facility]   COMPREHENSIVE METABOLIC PANEL - Abnormal; Notable for the following components:    Sodium 133 (*)     CO2 11 (*)     Glucose 117 (*)     BUN, Bld 71 (*)     Creatinine 1.8 (*)     Calcium 7.2 (*)     Total Protein 4.3 (*)     Albumin 1.6 (*)     Alkaline Phosphatase 174 (*)     eGFR if  43 (*)     eGFR if non  37 (*)     All other components within normal limits   URINALYSIS, REFLEX TO URINE CULTURE - Abnormal; Notable for the following components:    Appearance, UA Cloudy (*)     Protein, UA 2+ (*)     Glucose, UA 1+ (*)     Bilirubin (UA) 1+ (*)     Occult Blood UA 3+ (*)     Leukocytes, UA 2+ (*)     All other components within normal limits    Narrative:     Specimen Source->Urine   TROPONIN I - Abnormal; Notable for the following components:    Troponin I 1.878 (*)     All other components within normal limits   LACTIC ACID, PLASMA - Abnormal; Notable for the following components:    Lactate (Lactic Acid) 2.9 (*)     All other components within normal limits   D DIMER, QUANTITATIVE - Abnormal; Notable for the following components:    D-Dimer 0.73 (*)     All other components within normal limits   URINALYSIS MICROSCOPIC - Abnormal; Notable for the following components:    RBC, UA 13 (*)     WBC, UA 8 (*)     All other components within normal limits    Narrative:      Specimen Source->Urine   POCT OCCULT BLOOD STOOL - Abnormal; Notable for the following components:    Fecal Occult Blood Positive (*)     All other components within normal limits   CULTURE, BLOOD   CULTURE, BLOOD   CLOSTRIDIUM DIFFICILE   CULTURE, STOOL   LACTIC ACID, PLASMA   B-TYPE NATRIURETIC PEPTIDE    Narrative:     Philomena. [id number] by BL1 at 10/19/2020 02:24, reason: Specimen   clotted  Tube has been discarded  Called to Nicolasa RN  @ 0223 on 19Oct2020.  BML   D DIMER, QUANTITATIVE   LIPASE   SARS-COV-2 RDRP GENE   TYPE & SCREEN     EKG Readings: (Independently Interpreted)   Initial EKG with undetermined rhythm at a rate of 65 beats per minute with a leftward axis however significant amount of artifact.  No repeat EKG is grossly unchanged with a rate of 111 beats per minute, prolonged QT at 530. It is consistent with Afib with low voltage QRS. No old EKG to compare.       Imaging Results          CT Abdomen Pelvis  Without Contrast (In process)                 CTA Chest Non-Coronary (PE Study) (Final result)  Result time 10/19/20 04:47:41    Final result by Oneil Kincaid MD (10/19/20 04:47:41)                 Impression:      1.  Partially occlusive filling defect within a left lower lobe segmental branch concerning for pulmonary thromboembolism.    2.  Apparent circumferential wall thickening involving the visualized colon within the upper abdomen.  This may relate to a nonspecific colitis including infectious, inflammatory, or ischemic etiologies.  Clinical correlation is advised.  Consider further assessment with dedicated abdominal CT imaging.    3.  Branching intrahepatic air within the left hepatic lobe.  It is unclear whether this represents pneumobilia, given patient's cholecystectomy, versus portal venous gas given apparent colonic wall thickening.  No gas appreciated within the extrahepatic portal vein or SMV.    4.  Patchy right lower lobe opacities which may relate to developing  infection, aspiration, hemorrhage, or asymmetric edema.    5.  Focal oval opacity at the left lung base abutting the pleura with small volume adjacent pleural fluid and thickening.  This may relate to rounded atelectasis, although correlation with any prior outside imaging is advised to assess for stability.  In the absence of prior imaging, short-term three-month noncontrast chest CT follow-up is advised to ensure stability and exclude neoplastic process.    6.  Additional scattered pulmonary nodules which may also be assessed at the time of follow-up imaging.    7.  Significant nodular masslike thickening of the left adrenal gland.  Correlation with any prior outside imaging advised.    8.  Additional findings as above.    This report was flagged in Epic as abnormal.      Electronically signed by: Oneil Kincaid MD  Date:    10/19/2020  Time:    04:47             Narrative:    EXAMINATION:  CTA CHEST NON CORONARY    CLINICAL HISTORY:  PE suspected, intermediate prob, positive D-dimer;    TECHNIQUE:  Low dose axial images, sagittal and coronal reformations were obtained from the thoracic inlet to the lung bases following the IV administration of 100 mL of Omnipaque 350.  Contrast timing was optimized to evaluate the pulmonary arteries.  MIP images were performed.    COMPARISON:  Chest radiograph 10/19/2020    FINDINGS:  The visualized soft tissue structures at the base of the neck are unremarkable.  There is a right-sided PICC line with tip terminating in the SVC.    The thoracic aorta maintains normal caliber, contour, and course without significant atherosclerotic calcification within its course.  There is no evidence of aneurysmal dilation or dissection. The heart is not enlarged and there is no evidence of pericardial effusion.The esophagus maintains a normal course and caliber.There is no axillary, mediastinal, or hilar lymph node enlargement.    The trachea is midline and patent.  There is asymmetric  left-sided volume loss.  Detailed assessment of the lung parenchyma is limited by respiratory motion, particularly at the lung bases.  There is bilateral paraseptal emphysematous change.  There is a large bleb at the medial aspect of the right upper lobe.  There are patchy right lower lobe opacities.  There is a 0.5 cm left upper lobe pulmonary nodule and a 0.5 cm right upper lobe pulmonary nodule.  There is an oval 4.5 x 1.7 cm peripheral opacity within the left lower lobe.  There is a small volume of adjacent pleural fluid and thickening..  There are scattered bandlike opacities throughout the left lung suggesting platelike atelectasis or scarring.    There is no evidence of central saddle type pulmonary embolism.  There is a non dependent partially occlusive filling defect identified within a left lower lobe segmental branch concerning for pulmonary embolism (for example axial series 3, image 393).  No definite additional discrete intraluminal filling defects appreciated.    Limited images of the upper abdomen obtained during the course of this dedicated thoracic CT demonstrates apparent circumferential wall thickening involving the visualized colon which may relate to a nonspecific colitis.  There is branching intrahepatic air within the left hepatic lobe.  It is unclear whether this represents pneumobilia (in light of cholecystectomy) or portal venous gas. No gas visualized within the extrahepatic portal vein or SMV.  There is prominent diffuse nodular thickening of the left adrenal gland.  There is a suspected 2.6 cm right renal cyst.  The kidneys demonstrate bilateral calcifications which may relate to nonobstructing calculi versus vascular calcification.  No free intraperitoneal air visualized within the upper abdomen.    The osseous structures demonstrate moderate degenerative changes of the visualized spine.                               X-Ray Chest AP Portable (Final result)  Result time 10/19/20 01:43:05     Final result by Naiam Kothari MD (10/19/20 01:43:05)                 Impression:      Blunting of left costophrenic angle, as above described.      Electronically signed by: Naima Kothari  Date:    10/19/2020  Time:    01:43             Narrative:    EXAMINATION:  AP PORTABLE CHEST    CLINICAL HISTORY:  Sepsis;    TECHNIQUE:  AP portable chest radiograph was submitted.    COMPARISON:  None.    FINDINGS:  The tip of the right PICC line is in satisfactory position in the superior vena cava.  The AP portable chest radiograph demonstrates a cardiac silhouette within normal limits.  There is blunting of the left costophrenic angle, which may suggest small to moderate left pleural effusion and/or pleural thickening.  No pneumothorax is present.  There are overlying cardiac leads.  There is dextroscoliosis.                                 Medical Decision Making:   History:   I obtained history from: someone other than patient and EMS provider.  Old Medical Records: I decided to obtain old medical records.  Clinical Tests:   Lab Tests: Ordered and Reviewed  Radiological Study: Ordered and Reviewed  Medical Tests: Ordered and Reviewed  ED Management:   MDM  This is an emergent evaluation of a 69 y/o male stroke with right-sided hemiplegia, UTI who is presenting to the ED via EMS from [facility] with a CC of altered mental status that began 1 day ago.  Initial vitals in the ER remarkable for a blood pressure 66 over palpable, pulse 109 with respirations 22 with a rectal temperature of 96.5°.  Physical exam notable for chronically ill-appearing male with dry mucous membranes. Heart RRR, no murmurs, gallops or rubs but skin cool to touch with capillary refill 2-3 seconds. Excoriation to rectum without signs of ulcers. Moving all extremities, no focal neurologic deficits. DDx includes but is not limited to sepsis, ACS, hypovolemia, acute kidney injury. Given history and presentation, labs and imaging  including a septic workup was obtained.  Labs remarkable for an elevated troponin at 1.878, leukocytosis with a white blood cell count of 15, elevated creatinine at 1.8 with a BUN of 71 and bicarb of 11.  Mild hyponatremia with a sodium of 133. Alk-phos 174.  UA remarkable for cloudy appearing urine with 2+ protein 1+ sugar and 2+ leuk esterase.  Initial labs unremarkable.  No baseline troponin available in Care everywhere, however creatinine appears elevated when compared to previous Cr from August 2020.  Given hypotension in the ED, the PICC line team was notified and PICC line was placed in the right upper extremity.  Chest x-ray showed blunting of the left costophrenic angle with PICC line in place.  I also obtain a D-dimer which was elevated.  Given elevation D-dimer to rule out PE a CTA was obtained.  EKG difficult to interpret given baseline artifact.  It appears to be Afib with low voltage QRS. No old EKG to compare.  Patient reassess and was more alert.  He denies any chest pain or other symptoms.    Patient also started on Levophed for hypertension.  Case discussed with Dr. Clark for ICU admission. Will sign out to Dr. Farah pending CTA to rule out PE.    Halina Dorsey D.O  EMERGENCY MEDICINE  4:12 AM 10/19/2020      0530:  CTA chest shows evidence of filling defect in left lower lobe consistent with pulmonary embolism.  There is a wedge-shaped infiltrate left lower lobe that may represent an infarct could also represent pneumonia.  There is small pleural effusion.  He has ground-glass infiltrate in the right lung field also.  Upper portions of the abdominal cavity reviewed showed possible colitis.  CT abdomen pelvis was ordered.  Patient was noted to be significantly anemic when compared to H&H done at [facility] 2 months ago.  Hemoccult stool was positive.  Patient is having foul-smelling diarrhea.  Is green in color.  C diff studies have been sent.  Stool culture has been  sent.  Urinalysis shows mild pyuria without bacteria.  Patient has had recent Enterococcus faecalis UTI.  Sensitive to vancomycin.  Patient has been started empiric antibiotics for suspected sepsis.  Patient has been hypothermic in the emergency room.  Patient remains in atrial fibrillation heart rate varies between 90 and 120.  Blood pressure improved with Levophed.  Patient has received 3 L of IV fluid for dehydration.  Patient has had minimal urine output.  Plan to admit ICU.  Abdominal CT results are pending.  Will discuss anticoagulation plan with admitting physicians.  Anticoagulation is complicated by the fact the patient is Hemoccult positive with significant drop in H&H.  Troponin is elevated which may be due to pulmonary embolism but also may be secondary to NSTEMI.  Plan to trend during admission.  Renal dysfunction and sepsis may be contributing.    0620:  CT abdomen pelvis shows diffuse colon thickening lower pronounced in the rectosigmoid colon.  There were inflammatory changes associated with this.  Will treat for colitis.  Will add Zosyn.  Discussed with Dr. Dyer.  Will initiate heparin infusion for pulmonary embolism because of hypotension and tachycardia.  Benefits outweigh risk of mild current GI bleeding.  Anemia may be due to to recent surgeries and chronic disease.  Will transfuse 2 units of red blood cells for anemia and heme-positive stool.  Will monitor H&H during admission.  Plan to consult GI and Cardiology on admission.  Will patient will be admitted to the ICU.  Blood pressure is stabilized.  Heart rate is fluctuating between 110 and 130.  Patient remains in AFib.  Patient having short runs of V-tach.  Will discuss antiarrhythmic options with Cardiology.    0630:  Discussed with Dr. Polo.  Will start amiodarone infusion.  Give 150 mg bolus.  So recommends a bolus of digoxin rate controlled atrial fibrillation.  Will attempt to wean Levophed once blood pressure is stable as after blood  transfusion.  Will see in consult in ICU.            Scribe Attestation:   Scribe #1: I performed the above scribed service and the documentation accurately describes the services I performed. I attest to the accuracy of the note.                      Clinical Impression:     ICD-10-CM ICD-9-CM   1. Altered mental status  R41.82 780.97   2. Hypotensive episode  I95.9 458.9   3. Elevated troponin  R77.8 790.6   4. VIRI (acute kidney injury)  N17.9 584.9   5. Pulmonary embolism, unspecified chronicity, unspecified pulmonary embolism type, unspecified whether acute cor pulmonale present  I26.99 415.19   6. Atrial fibrillation, unspecified type  I48.91 427.31   7. Dehydration  E86.0 276.51   8. Diarrhea, unspecified type  R19.7 787.91   9. Anemia, unspecified type  D64.9 285.9   10. Gastrointestinal hemorrhage, unspecified gastrointestinal hemorrhage type  K92.2 578.9   11. Sepsis, due to unspecified organism, unspecified whether acute organ dysfunction present  A41.9 038.9     995.91                          ED Disposition Condition    Admit            I, Liam Farah MD, personally performed the services described in this documentation. All medical record entries made by the scribe were at my direction and in my presence.  I have reviewed the chart and agree that the record reflects my personal performance and is accurate and complete.                   Liam Farah MD  10/19/20 0624       Liam Farah MD  10/19/20 0634

## 2020-10-19 NOTE — HPI
69 y/o male was sent from NH with altered mental status.  Patient is currently confused and unable to give history.  History obtained from ER notes and daughter.  Patient is apparently very sharp with normal mental status per daughter.  He presented to ER where he was noted to be hypothermic, tachycardic and hypotensive.  Patient recently underwent left knee replacement at [place] on 8/2020.  Per daughter, he was also recently treated for UTI with IV ABx's per PICC line.  He has been complaining of severe diarrhea since ABx's stopped.  Patient noted to be in rapid Afib.  No previous hx of Afib.  Labs showing leukocytosis and H/H lower than recent labs.  Stool heme positive in ER.  Labs also showing ARF and elevated Troponin.  CTA of chest showing filling defect and CT of abdomen showing colonic wall inflammation.  No further history able to be obtained.

## 2020-10-19 NOTE — ASSESSMENT & PLAN NOTE
Suspect his is 2/2 sepsis and possible cardiogenic shock related to afib w/ RVR and depressed LVEF. POCUS with estimated LVEF of 10%. There is a possible small PE on CTA in the LLL, but it does not appear acute. No e/o RV strain.    - Cont heparin gtt  - Cont BSAbx  - PICC line placed on admission  - Hold on steroids at this time  - F/u cultures  - Hold on cardioversion to avoid discomfort

## 2020-10-19 NOTE — SIGNIFICANT EVENT
Called to bedside as patient in asystole.  Patient seen and examined.  Unresponsive to all commands.  No pulse palpated.  No breath or heart sounds on auscultation.    Patient declared dead at 3:16 PM.

## 2020-10-19 NOTE — ASSESSMENT & PLAN NOTE
Suspect this is 2/2 sepsis as well as a possible MI. Not able to articulate but is able to say yes and no. Mental status actively worsening. GOC discussion with son who is the patient's POA. He states the patient would not want invasive measures to prolong life and would like to focus more on comfort.

## 2020-10-19 NOTE — ASSESSMENT & PLAN NOTE
Possible demand ischemia from shock and uncontrolled HR.  Already on Heparin gtt for PE.  Cardiology consulted.

## 2020-10-19 NOTE — PLAN OF CARE
10/19/20 1207   Discharge Assessment   Assessment Type Discharge Planning Assessment   Confirmed/corrected address and phone number on facesheet? Yes   Assessment information obtained from? Caregiver   Prior to hospitilization cognitive status: Not Oriented to Place   Prior to hospitalization functional status: Completely Dependent   Current cognitive status: Not Oriented to Place   Current Functional Status: Completely Dependent   Facility Arrived From: [facility]   Lives With facility resident   Able to Return to Prior Arrangements yes   Is patient able to care for self after discharge? No   Who are your caregiver(s) and their phone number(s)? Ben Olivia [phone]   Patient's perception of discharge disposition senior care care facility   Readmission Within the Last 30 Days no previous admission in last 30 days   Patient currently being followed by outpatient case management? No   Patient currently receives any other outside agency services? No   Equipment Currently Used at Home hospital bed   Do you have any problems affording any of your prescribed medications? No   Is the patient taking medications as prescribed? yes   Does the patient have transportation home? Yes   Transportation Anticipated health plan transportation   Dialysis Name and Scheduled days N/A   Does the patient receive services at the [facility]? No   Discharge Plan A Return to nursing home   DME Needed Upon Discharge  none   Patient/Family in Agreement with Plan yes         PT son stated he isn't happy with the care his father is receiving at [facility], he stated he would like him to go to another Nursing home if possible. SW provided pt son Ben a list of nursing homes.

## 2020-10-19 NOTE — CONSULTS
[facility]  Palliative Medicine  Consult Note    Patient Name: Madhavi Olivia  MRN: [id number]  Admission Date: 10/19/2020  Hospital Length of Stay: 0 days  Code Status: DNR   Attending Provider: John Paul Dyer MD  Consulting Provider: Elsa Yee NP  Primary Care Physician: No primary care provider on file.  Principal Problem:Shock    Patient information was obtained from patient, relative(s), caregiver / friend, past medical records and ER records.      Inpatient consult to Palliative Care  Consult performed by: Elsa Yee NP  Consult ordered by: John aPul Dyer MD  Reason for consult: goals of care        Assessment/Plan:     Plan:   -DNR  -Comfort focused care  -HCPOA copy to chart. Designated HCPOA: Chantel Olivia    Thank you for your consult. I will sign off. Please contact us if you have any additional questions.    Subjective:     HPI:   71 y/o male was sent from NH with altered mental status.  Patient is currently confused and unable to give history.  History obtained from ER notes and daughter.  Patient is apparently very sharp with normal mental status per daughter.  He presented to ER where he was noted to be hypothermic, tachycardic and hypotensive.  Patient recently underwent left knee replacement at [facility] on 8/2020.  Per daughter, he was also recently treated for UTI with IV ABx's per PICC line.  He has been complaining of severe diarrhea since ABx's stopped.  Patient noted to be in rapid Afib.  No previous hx of Afib.  Labs showing leukocytosis and H/H lower than recent labs.  Stool heme positive in ER.  Labs also showing ARF and elevated Troponin.  CTA of chest showing filling defect and CT of abdomen showing colonic wall inflammation.  No further history able to be obtained.       Hospital Course:  No notes on file    Palliative medicine consult received and discussed with Dr. Dyer.  Patient resting with son, Ben, at bedside.  Patient oriented to  person only.  Able to answer simple questions only.  Palliative services introduced to Ben and he was receptive to visit.      Prior to hospitalization, patient was oriented x 3 and interactive. He has been bedbound for approx 1 year, after experiencing severe knee pain and undergoing knee replacement, with no improvement. Due to coronavirus restrictions at nursing home, visits were limited but pt's son and daughter visited as often as they could.  Per Ben, patient has lost more than 100 pounds in last year (per medical records, on 7/18/19, patient's weight was 406, now 253).  Ben discussed the decline in function that has been difficult for patient, since August 2020. He has required on hospitalization in the last year and has ongoing hypotension, all of which has been concerning for patient and his son.     Advance Care Planning     Power of   I initiated the process of advance care planning today and explained the importance of this process to the patient's son.  I introduced the concept of advance directives, as well. The patient has previously appointed a HCPOA and designated [place] as HCPOA.   Ben provided a copy of this, which was verified and a copy placed in chart.     Patient also has a daughter, Delmi, who is very involved in patient's care.      Discussed code status options, including risks and benefits of full code status, partial code status, and DNR status.  Ben stated that patient would want every measure taken to extend life, including CPR and mechanical ventilation, even if quality of life was decreased.  During this conversation, although patient had initially appeared comfortable and denied complaints, he began to appear agitated and asked for BP cuff to be removed.  At first, he was able to be consoled and within a few minutes, agitation increased. Discussed interventions for agitation, including ativan and initially Ben declined as he was concerned regarding  possibility of hypotension.  However, as patient's agitation continued, discussed risks vs benefits and Ben ok with Ativan.  Discussed that small dose could be given and increased if needed or discontinued if needed. Ben became upset with the change in cognition and offered to continue goals of care conversation in conference room to provide a space for him to express emotions. Time was allowed for him to express concerns.  Emotional support provided.  Ben expressed concern that his presence was worsening patient's condition and it was discussed that medical conditions can affect behavior and it appears that patient's medical condition is declining rapidly.     Advance Care Planning     [facility] [place]  I engaged the family in a conversation about advance care planning and we specifically addressed what the goals of care would be moving forward, in light of the patient's change in clinical status, specifically shock, pulmonary embolism, STEMI.  We did specifically address the patient's likely prognosis, which is poor. During this conversation, Ben became appropriately tearful and discussed his mother (patient's wife) death, while in the ICU, after rapid decline.      He understands that patient's status has rapidly declined and although he previously stated he wanted every opportunity to extend patient's life, we discussed risks and benefits of code status further in relation to decline since conversation a few minutes ago. We explored the patient's values and preferences for future care.  The family endorses that what is most important right now is to focus on symptom/pain control and quality of life, even if it means sacrificing a little time.  Ben states that it is most important that patient be comfortable and if he had to choose between more time and less quality of life vs more quality of life/comfort and less time, he would choose to focus on comfort only. At this time, he became more tearful and  provide him a few minutes alone to process emotions.  Discussed conversation with Dr. Dyer and Dr. Pandya, who was evaluating patient.  Since patient's son had stepped away to discuss goals of care, patient had decompensated further, now with increased agitation and now with chest pain.     Returned to patient's son with Dr. Pandya, who updated Bettina[place] on patient's condition.  Valance confirmed decision to transition to comfort care and DNR status. Offered to contact family and he declined, stating that he had just spoke to patient's daughter, Delmi.  Ben will go to  his son from school so that he can visit.  Discussed with Dr. Pandya, who ordered comfort interventions.     I did not explain the role for hospice care at this stage of the patient's illness due to acute change in condition. We will not be making a hospice referral at this time but will determine based on condition.     Encounter: [id number]      1400: Patient's friend, [place], at bedside to visit.  Patient's son had returned with his son but left to bring his son home and plans to return.     1515: Notified of patient's death.  Call to patient's son, no answer.  Offered condolences to [place] and he stated he talked to patient's children and they have been notified.  Berlin requested to wait for family to arrive in waiting room.  He was escorted to waiting room and  notified.  Bereavement tray ordered.                         Past Medical History:   Diagnosis Date    BPH (benign prostatic hyperplasia)     CKD (chronic kidney disease)     Constipation     GERD (gastroesophageal reflux disease)     Hemiplegia affecting dominant side, post-stroke     Hyperlipidemia     Hypertension     Hypokalemia     Muscle wasting and atrophy, not elsewhere classified, multiple sites     Orthostatic hypotension     Osteoarthritis     Stroke     TIA (transient ischemic attack)     UTI (urinary tract infection)        Past Surgical  History:   Procedure Laterality Date    CHOLECYSTECTOMY      JOINT REPLACEMENT      TRP         Review of patient's allergies indicates:  No Known Allergies    Medications:  Continuous Infusions:   amiodarone in dextrose 5% Stopped (10/19/20 1516)    heparin (porcine) in D5W Stopped (10/19/20 1516)    norepinephrine bitartrate-D5W Stopped (10/19/20 1516)    norepinephrine bitartrate-D5W Stopped (10/19/20 1240)     Scheduled Meds:   midodrine  2.5 mg Oral BID WM    mupirocin   Nasal BID    pantoprazole  40 mg Intravenous BID    piperacillin-tazobactam (ZOSYN) IVPB  4.5 g Intravenous Q8H    sodium chloride 0.9%  10 mL Intravenous Q6H    vancomycin  125 mg Oral Q6H     PRN Meds:sodium chloride, acetaminophen, heparin (PORCINE), heparin (PORCINE), lorazepam, morphine, ondansetron, polyethylene glycol, Flushing PICC Protocol **AND** sodium chloride 0.9% **AND** sodium chloride 0.9%, sodium chloride 0.9%    Family History     None        Tobacco Use    Smoking status: Unknown If Ever Smoked   Substance and Sexual Activity    Alcohol use: Not Currently    Drug use: Not on file    Sexual activity: Not on file       Review of Systems   Unable to perform ROS: Mental status change     Objective:     Vital Signs (Most Recent):  Temp: 96.4 °F (35.8 °C) (10/19/20 1346)  Pulse: (!) 0 (10/19/20 1515)  Resp: 10 (10/19/20 1515)  BP: 98/75 (10/19/20 1245)  SpO2: (!) 86 % (10/19/20 1515) Vital Signs (24h Range):  Temp:  [94.6 °F (34.8 °C)-98 °F (36.7 °C)] 96.4 °F (35.8 °C)  Pulse:  [0-150] 0  Resp:  [10-40] 10  SpO2:  [83 %-100 %] 86 %  BP: ()/(34-88) 98/75     Weight: 114.8 kg (253 lb 1.4 oz)  Body mass index is 29.25 kg/m².    Physical Exam  Vitals signs and nursing note reviewed.   Constitutional:       General: He is not in acute distress.     Appearance: He is ill-appearing.   HENT:      Head: Normocephalic and atraumatic.      Nose: Nose normal.   Eyes:      General:         Right eye: No discharge.          Left eye: No discharge.   Neck:      Musculoskeletal: Normal range of motion.   Cardiovascular:      Rate and Rhythm: Tachycardia present.   Pulmonary:      Effort: Pulmonary effort is normal. No respiratory distress.   Abdominal:      Palpations: Abdomen is soft.   Skin:     General: Skin is warm and dry.   Neurological:      Motor: Weakness present.      Comments: + Confusion, able to answer simple questions         Review of Symptoms    Symptom Assessment (ESAS 0-10 Scale)  Pain:  0  Dyspnea:  0  Anxiety:  0  Nausea:  0  Depression:  0  Anorexia:  0  Fatigue:  0  Insomnia:  0  Restlessness:  0  Agitation:  0         Comments:  Unable to complete ESAS: altered mental status          Performance Status:  10    Living Arrangements:  Lives in nursing home      Advance Care Planning   Advance Directives:   Living Will: No        Oral Declaration: No    LaPOST: No    Do Not Resuscitate Status: Yes    Medical Power of : Yes    Agent's Name:  Ben Olivia   Agent's Contact Number:  [phone]    Decision Making:  Family answered questions and Patient unable to communicate due to disease severity/cognitive impairment         Significant Labs: All pertinent labs within the past 24 hours have been reviewed.  CBC:   Recent Labs   Lab 10/19/20  0229   WBC 15.53*   HGB 7.7*   HCT 23.0*   MCV 94        BMP:  Recent Labs   Lab 10/19/20  0149   *   *   K 4.2      CO2 11*   BUN 71*   CREATININE 1.8*   CALCIUM 7.2*     LFT:  Lab Results   Component Value Date    AST 32 10/19/2020    ALKPHOS 174 (H) 10/19/2020    BILITOT 0.8 10/19/2020     Albumin:   Albumin   Date Value Ref Range Status   10/19/2020 1.6 (L) 3.5 - 5.2 g/dL Final     Protein:   Total Protein   Date Value Ref Range Status   10/19/2020 4.3 (L) 6.0 - 8.4 g/dL Final     Lactic acid:   Lab Results   Component Value Date    LACTATE 2.9 (H) 10/19/2020    LACTATE 2.2 10/19/2020       Significant Imaging: I have reviewed all pertinent  imaging results/findings within the past 24 hours.    60 min spent in ACP      Elsa Yee NP  Palliative Medicine  [facility]

## 2020-10-19 NOTE — ASSESSMENT & PLAN NOTE
Probably pre renal secondary to fluid depletion.  Patient dehydrated secondary to diarrhea.  Given fluids in ER.   Now receiving blood.  Will closely monitor.  Avoid nephrotoxic medications.

## 2020-10-19 NOTE — ASSESSMENT & PLAN NOTE
Imaging not c/w an acute PE. If there is a clot there, it is either subacute or chronic as it is adhered to the vessel wall. Agree w/ anticoagulation.

## 2020-10-19 NOTE — ED NOTES
Unable to blood work at this time. Only able to obtain 22g in hand, MD made aware, picc team called for picc line. Lab notified to obtain blood work.

## 2020-10-20 LAB
BLD PROD TYP BPU: NORMAL
BLD PROD TYP BPU: NORMAL
BLOOD UNIT EXPIRATION DATE: NORMAL
BLOOD UNIT EXPIRATION DATE: NORMAL
BLOOD UNIT TYPE CODE: 5100
BLOOD UNIT TYPE CODE: 5100
BLOOD UNIT TYPE: NORMAL
BLOOD UNIT TYPE: NORMAL
C DIFF TOX GENS STL QL NAA+PROBE: POSITIVE
CODING SYSTEM: NORMAL
CODING SYSTEM: NORMAL
DISPENSE STATUS: NORMAL
DISPENSE STATUS: NORMAL
E COLI SXT1 STL QL IA: NEGATIVE
E COLI SXT2 STL QL IA: NEGATIVE
TRANS ERYTHROCYTES VOL PATIENT: NORMAL ML
TRANS ERYTHROCYTES VOL PATIENT: NORMAL ML

## 2020-10-22 LAB — BACTERIA STL CULT: NORMAL

## 2020-10-24 LAB
BACTERIA BLD CULT: NORMAL
BACTERIA BLD CULT: NORMAL

## 2024-11-22 NOTE — HOSPITAL COURSE
69 y/o male was sent from NH with altered mental status. He presented to ER where he was noted to be hypothermic, tachycardic and hypotensive.  Patient recently underwent left knee replacement at [facility] on 8/2020.  Per daughter, he was also recently treated for UTI with IV ABx's per PICC line.  He has been complaining of severe diarrhea since ABx's stopped.  Patient noted to be in rapid Afib.  No previous hx of Afib.  Labs showing leukocytosis and H/H lower than recent labs.  Stool heme positive in ER.  Labs also showing ARF and elevated Troponin.  CTA of chest showing filling defect and CT of abdomen showing colonic wall inflammation.  Patient started on Heparin drip and transfused 1 unit of blood.  GI, Cardiology and Critical Care consulted.  Patient admitted with shock.  Possibly combination of hypovolemic shock with probable septic shock.  BP did not improve in ER with IVF bolus and started on Levophed.  Did note that patient on home Midodrine (chronically hypotensive?).  Empirically started on Vanc and Zosyn.  Possible Cdiff colitis as source of infection.  Oral Vanc ordered.  Recent UTI treated with IV ABx's.  UA showing 2+ leukocytes, but only 8 WBC.  BCx obtained.  Patient started on Amiodarone gtt.  Repeat Troponin of 34, consistent with NSTEMI.  Echo showing EF of 10%, so also probable cardiogenic shock. Patient's condition continued to deteriorate with more evidence of arrhythmia and worsening hypotension.  Worsening mental status.  Conversation was held with family about patient's critical condition.  Patient's son (POA) stated patient would not want invasive measures to prolong life.  Patient was made DNR.  He was continued on all drips and pressors.  Condition continued to worsen until he was found in asystole.  Patient declared dead.  Family notified.  
Patient agreed with treatment plan. Follow up as needed.        Electronically signed by FRANCISCO JAVIER PADGETT MD on 11/22/2024 at 11:43 AM.